# Patient Record
Sex: FEMALE | Race: WHITE | NOT HISPANIC OR LATINO | URBAN - METROPOLITAN AREA
[De-identification: names, ages, dates, MRNs, and addresses within clinical notes are randomized per-mention and may not be internally consistent; named-entity substitution may affect disease eponyms.]

---

## 2018-12-26 ENCOUNTER — INPATIENT (INPATIENT)
Facility: HOSPITAL | Age: 83
LOS: 4 days | Discharge: HOSPICE | DRG: 871 | End: 2018-12-31
Attending: HOSPITALIST | Admitting: FAMILY MEDICINE
Payer: MEDICARE

## 2018-12-26 VITALS
WEIGHT: 160.06 LBS | HEART RATE: 96 BPM | SYSTOLIC BLOOD PRESSURE: 158 MMHG | RESPIRATION RATE: 22 BRPM | DIASTOLIC BLOOD PRESSURE: 118 MMHG

## 2018-12-26 DIAGNOSIS — Z85.038 PERSONAL HISTORY OF OTHER MALIGNANT NEOPLASM OF LARGE INTESTINE: Chronic | ICD-10-CM

## 2018-12-26 DIAGNOSIS — R06.03 ACUTE RESPIRATORY DISTRESS: ICD-10-CM

## 2018-12-26 DIAGNOSIS — F03.90 UNSPECIFIED DEMENTIA WITHOUT BEHAVIORAL DISTURBANCE: ICD-10-CM

## 2018-12-26 DIAGNOSIS — G93.40 ENCEPHALOPATHY, UNSPECIFIED: ICD-10-CM

## 2018-12-26 DIAGNOSIS — Z29.9 ENCOUNTER FOR PROPHYLACTIC MEASURES, UNSPECIFIED: ICD-10-CM

## 2018-12-26 DIAGNOSIS — J18.9 PNEUMONIA, UNSPECIFIED ORGANISM: ICD-10-CM

## 2018-12-26 LAB
ALBUMIN SERPL ELPH-MCNC: 3 G/DL — LOW (ref 3.3–5)
ALP SERPL-CCNC: 113 U/L — SIGNIFICANT CHANGE UP (ref 40–120)
ALT FLD-CCNC: 51 U/L — SIGNIFICANT CHANGE UP (ref 12–78)
ANION GAP SERPL CALC-SCNC: 11 MMOL/L — SIGNIFICANT CHANGE UP (ref 5–17)
APTT BLD: 26.1 SEC — LOW (ref 28.5–37)
AST SERPL-CCNC: 67 U/L — HIGH (ref 15–37)
BASOPHILS # BLD AUTO: 0.25 K/UL — HIGH (ref 0–0.2)
BASOPHILS NFR BLD AUTO: 1 % — SIGNIFICANT CHANGE UP (ref 0–2)
BILIRUB SERPL-MCNC: 0.6 MG/DL — SIGNIFICANT CHANGE UP (ref 0.2–1.2)
BUN SERPL-MCNC: 26 MG/DL — HIGH (ref 7–23)
CALCIUM SERPL-MCNC: 8.6 MG/DL — SIGNIFICANT CHANGE UP (ref 8.5–10.1)
CHLORIDE SERPL-SCNC: 108 MMOL/L — SIGNIFICANT CHANGE UP (ref 96–108)
CO2 SERPL-SCNC: 24 MMOL/L — SIGNIFICANT CHANGE UP (ref 22–31)
CREAT SERPL-MCNC: 1.1 MG/DL — SIGNIFICANT CHANGE UP (ref 0.5–1.3)
CRP SERPL-MCNC: 3.03 MG/DL — HIGH (ref 0–0.4)
EOSINOPHIL # BLD AUTO: 0 K/UL — SIGNIFICANT CHANGE UP (ref 0–0.5)
EOSINOPHIL NFR BLD AUTO: 0 % — SIGNIFICANT CHANGE UP (ref 0–6)
FLU A RESULT: SIGNIFICANT CHANGE UP
FLU A RESULT: SIGNIFICANT CHANGE UP
FLUAV AG NPH QL: SIGNIFICANT CHANGE UP
FLUBV AG NPH QL: SIGNIFICANT CHANGE UP
GLUCOSE SERPL-MCNC: 215 MG/DL — HIGH (ref 70–99)
HCT VFR BLD CALC: 40.4 % — SIGNIFICANT CHANGE UP (ref 34.5–45)
HGB BLD-MCNC: 13.2 G/DL — SIGNIFICANT CHANGE UP (ref 11.5–15.5)
INR BLD: 1.05 RATIO — SIGNIFICANT CHANGE UP (ref 0.88–1.16)
LACTATE SERPL-SCNC: 4.4 MMOL/L — CRITICAL HIGH (ref 0.7–2)
LACTATE SERPL-SCNC: 5.7 MMOL/L — CRITICAL HIGH (ref 0.7–2)
LYMPHOCYTES # BLD AUTO: 1.78 K/UL — SIGNIFICANT CHANGE UP (ref 1–3.3)
LYMPHOCYTES # BLD AUTO: 7 % — LOW (ref 13–44)
MCHC RBC-ENTMCNC: 32.7 GM/DL — SIGNIFICANT CHANGE UP (ref 32–36)
MCHC RBC-ENTMCNC: 32.8 PG — SIGNIFICANT CHANGE UP (ref 27–34)
MCV RBC AUTO: 100.2 FL — HIGH (ref 80–100)
MONOCYTES # BLD AUTO: 1.78 K/UL — HIGH (ref 0–0.9)
MONOCYTES NFR BLD AUTO: 7 % — SIGNIFICANT CHANGE UP (ref 2–14)
NEUTROPHILS # BLD AUTO: 21.56 K/UL — HIGH (ref 1.8–7.4)
NEUTROPHILS NFR BLD AUTO: 77 % — SIGNIFICANT CHANGE UP (ref 43–77)
PLATELET # BLD AUTO: 304 K/UL — SIGNIFICANT CHANGE UP (ref 150–400)
POTASSIUM SERPL-MCNC: 3.5 MMOL/L — SIGNIFICANT CHANGE UP (ref 3.5–5.3)
POTASSIUM SERPL-SCNC: 3.5 MMOL/L — SIGNIFICANT CHANGE UP (ref 3.5–5.3)
PROT SERPL-MCNC: 7.6 G/DL — SIGNIFICANT CHANGE UP (ref 6–8.3)
PROTHROM AB SERPL-ACNC: 12 SEC — SIGNIFICANT CHANGE UP (ref 10–12.9)
RBC # BLD: 4.03 M/UL — SIGNIFICANT CHANGE UP (ref 3.8–5.2)
RBC # FLD: 13.1 % — SIGNIFICANT CHANGE UP (ref 10.3–14.5)
RSV RESULT: SIGNIFICANT CHANGE UP
RSV RNA RESP QL NAA+PROBE: SIGNIFICANT CHANGE UP
SODIUM SERPL-SCNC: 143 MMOL/L — SIGNIFICANT CHANGE UP (ref 135–145)
WBC # BLD: 25.37 K/UL — HIGH (ref 3.8–10.5)
WBC # FLD AUTO: 25.37 K/UL — HIGH (ref 3.8–10.5)

## 2018-12-26 PROCEDURE — 99223 1ST HOSP IP/OBS HIGH 75: CPT | Mod: AI

## 2018-12-26 PROCEDURE — 99285 EMERGENCY DEPT VISIT HI MDM: CPT

## 2018-12-26 PROCEDURE — 93010 ELECTROCARDIOGRAM REPORT: CPT

## 2018-12-26 PROCEDURE — 99223 1ST HOSP IP/OBS HIGH 75: CPT

## 2018-12-26 PROCEDURE — 71045 X-RAY EXAM CHEST 1 VIEW: CPT | Mod: 26

## 2018-12-26 RX ORDER — ENOXAPARIN SODIUM 100 MG/ML
30 INJECTION SUBCUTANEOUS DAILY
Qty: 0 | Refills: 0 | Status: DISCONTINUED | OUTPATIENT
Start: 2018-12-26 | End: 2018-12-31

## 2018-12-26 RX ORDER — AZITHROMYCIN 500 MG/1
500 TABLET, FILM COATED ORAL ONCE
Qty: 0 | Refills: 0 | Status: COMPLETED | OUTPATIENT
Start: 2018-12-26 | End: 2018-12-26

## 2018-12-26 RX ORDER — ASPIRIN/CALCIUM CARB/MAGNESIUM 324 MG
325 TABLET ORAL ONCE
Qty: 0 | Refills: 0 | Status: DISCONTINUED | OUTPATIENT
Start: 2018-12-26 | End: 2018-12-26

## 2018-12-26 RX ORDER — SODIUM CHLORIDE 9 MG/ML
1000 INJECTION INTRAMUSCULAR; INTRAVENOUS; SUBCUTANEOUS ONCE
Qty: 0 | Refills: 0 | Status: COMPLETED | OUTPATIENT
Start: 2018-12-26 | End: 2018-12-26

## 2018-12-26 RX ORDER — CEFTRIAXONE 500 MG/1
1 INJECTION, POWDER, FOR SOLUTION INTRAMUSCULAR; INTRAVENOUS EVERY 24 HOURS
Qty: 0 | Refills: 0 | Status: DISCONTINUED | OUTPATIENT
Start: 2018-12-26 | End: 2018-12-30

## 2018-12-26 RX ORDER — FUROSEMIDE 40 MG
40 TABLET ORAL ONCE
Qty: 0 | Refills: 0 | Status: COMPLETED | OUTPATIENT
Start: 2018-12-26 | End: 2018-12-26

## 2018-12-26 RX ORDER — IPRATROPIUM/ALBUTEROL SULFATE 18-103MCG
3 AEROSOL WITH ADAPTER (GRAM) INHALATION ONCE
Qty: 0 | Refills: 0 | Status: COMPLETED | OUTPATIENT
Start: 2018-12-26 | End: 2018-12-26

## 2018-12-26 RX ORDER — CEFTRIAXONE 500 MG/1
1 INJECTION, POWDER, FOR SOLUTION INTRAMUSCULAR; INTRAVENOUS ONCE
Qty: 0 | Refills: 0 | Status: COMPLETED | OUTPATIENT
Start: 2018-12-26 | End: 2018-12-26

## 2018-12-26 RX ORDER — AZITHROMYCIN 500 MG/1
500 TABLET, FILM COATED ORAL EVERY 24 HOURS
Qty: 0 | Refills: 0 | Status: DISCONTINUED | OUTPATIENT
Start: 2018-12-26 | End: 2018-12-29

## 2018-12-26 RX ORDER — ASPIRIN/CALCIUM CARB/MAGNESIUM 324 MG
300 TABLET ORAL ONCE
Qty: 0 | Refills: 0 | Status: COMPLETED | OUTPATIENT
Start: 2018-12-26 | End: 2018-12-26

## 2018-12-26 RX ADMIN — CEFTRIAXONE 100 GRAM(S): 500 INJECTION, POWDER, FOR SOLUTION INTRAMUSCULAR; INTRAVENOUS at 08:22

## 2018-12-26 RX ADMIN — Medication 300 MILLIGRAM(S): at 08:50

## 2018-12-26 RX ADMIN — SODIUM CHLORIDE 1000 MILLILITER(S): 9 INJECTION INTRAMUSCULAR; INTRAVENOUS; SUBCUTANEOUS at 08:50

## 2018-12-26 RX ADMIN — Medication 3 MILLILITER(S): at 08:14

## 2018-12-26 RX ADMIN — Medication 40 MILLIGRAM(S): at 08:22

## 2018-12-26 RX ADMIN — SODIUM CHLORIDE 1000 MILLILITER(S): 9 INJECTION INTRAMUSCULAR; INTRAVENOUS; SUBCUTANEOUS at 12:36

## 2018-12-26 RX ADMIN — AZITHROMYCIN 500 MILLIGRAM(S): 500 TABLET, FILM COATED ORAL at 12:36

## 2018-12-26 RX ADMIN — AZITHROMYCIN 255 MILLIGRAM(S): 500 TABLET, FILM COATED ORAL at 08:38

## 2018-12-26 RX ADMIN — CEFTRIAXONE 1 GRAM(S): 500 INJECTION, POWDER, FOR SOLUTION INTRAMUSCULAR; INTRAVENOUS at 09:00

## 2018-12-26 NOTE — CONSULT NOTE ADULT - ASSESSMENT
89 yo female hx of complete heart block (no pacemaker), htn BIBEMS from private nursing facility c/o shortness of breath/possible aspiration pneumonia. EMS placed patient on cpap.  Found to be hypoxic to 80's at facility.  Has MOLST is DNR/DNI. History was obtained from Dr. Lau (PMD) and daughter in law by Dr. King. Family members not present as bedside when I went to assess.  Here in ED WBC 25 lactate 5.7 Trop 0.375 proBNP 53071. Chest X ray reveals patchy infiltrates at lung bases and a small left pleural effusion with cardiomegaly. No prior echos available. EKG reveals sinus rhythm with occasional premature ventricular complexes in a pattern of bigeminy and possible premature atrial complexes with aberrant conduction. Left axis deviation and LBBB.     - FU CK, CKMB and trend CE q6   - Monitor closely for the development of anginal symptoms or clinical signs of ischemia.   - Elevated pro BNP ??   - Echo to assess for structural cardiac disease   - Continue BiPAP and abx as SOB likely 2/2 PNA    - Monitor and replete lytes, keep K>4, Mg>2.  - Strict I/Os, daily weights.  - Other cardiovascular workup will depend on clinical course.  - All other workup per primary team.  - Will continue to follow. 89 yo female hx of complete heart block (no pacemaker), htn BIBEMS from private nursing facility c/o shortness of breath/possible aspiration pneumonia. EMS placed patient on cpap.  Found to be hypoxic to 80's at facility.  Has MOLST is DNR/DNI. History was obtained from Dr. Lau (PMD) and daughter in law by Dr. King. Family members not present as bedside when I went to assess.  Here in ED WBC 25 lactate 5.7 Trop 0.375 proBNP 34115. Chest X ray reveals patchy infiltrates at lung bases and a small left pleural effusion with cardiomegaly. No prior echos available. EKG reveals sinus rhythm with occasional premature ventricular complexes in a pattern of bigeminy and possible premature atrial complexes with aberrant conduction. Left axis deviation and LBBB.     - FU CK, CKMB and trend CE q6   - Monitor closely for the development of anginal symptoms or clinical signs of ischemia.   - Elevated pro BNP possibly volume overloaded. S/p lasix 40 IV. Continue to monitor volume status.   - Echo to assess for structural cardiac disease   - Continue BiPAP and abx as SOB likely 2/2 PNA    - Monitor and replete lytes, keep K>4, Mg>2.  - Strict I/Os, daily weights.  - Other cardiovascular workup will depend on clinical course.  - All other workup per primary team.  - Will continue to follow. 89 yo female hx of complete heart block (no pacemaker), htn BIBEMS from private nursing facility c/o shortness of breath/possible aspiration pneumonia. EMS placed patient on cpap.  Found to be hypoxic to 80's at facility.  Has MOLST is DNR/DNI. History was obtained from Dr. Lau (PMD) and daughter in law by Dr. King. Family members not present as bedside when I went to assess.  Here in ED WBC 25 lactate 5.7 Trop 0.375 proBNP 33021. Chest X ray reveals patchy infiltrates at lung bases and a small left pleural effusion with cardiomegaly. No prior echos available. EKG reveals sinus rhythm with LBBB and occasional premature ventricular complexes in a pattern of bigeminy vs possible premature atrial complexes with aberrant conduction.    - Doubt ACS. CE leak most likely in the setting of sepsis and lactic acidosis.  - F/U CK, CKMB and trend CE until peak  - Monitor closely for the development of anginal symptoms or clinical signs of ischemia. The chronicity of her LBBB is unknown. Can start ASA 81 mg po daily   - Elevated pro BNP possibly volume overloaded though her exam is not impressive. S/p lasix 40 IV. Would try to keep net even, and continue to monitor volume status.   - Echo to assess for structural cardiac disease   - Continue BiPAP and Abx. Oxygen supplementation as necessary  - Monitor and replete lytes, keep K>4, Mg>2.  - Strict I/Os, daily weights.  - Other cardiovascular workup will depend on clinical course.  - Will continue to follow.  - Prognosis is guarded. Goals of care to be discussed with the family.

## 2018-12-26 NOTE — INPATIENT CERTIFICATION FOR MEDICARE PATIENTS - RISKS OF ADVERSE EVENTS
Concern for delay in diagnosis and treatment/Concern for renal deterioration/Other:/Concern for neurologic deterioration/Concern for cardiopulmonary deterioration

## 2018-12-26 NOTE — ED ADULT NURSE NOTE - OBJECTIVE STATEMENT
Per EMS pt experienced difficulty breathing this AM, pt is from nursing [facility] home, and is normally able to breathe spontaneously without difficulty, per son, Dr. Villalobos pt is confused at baseline but alert.

## 2018-12-26 NOTE — ED PROVIDER NOTE - PROGRESS NOTE DETAILS
discussed case with Dr. Blanchard, will admit on tele discussed case with Dr. Calix, 30cc/kg of IV fluids not met since patient may be in CHF in fear of fluid overload

## 2018-12-26 NOTE — CONSULT NOTE ADULT - SUBJECTIVE AND OBJECTIVE BOX
[facility]         Arlyn Hester, José Miguel, Amaris, Ruby, Eddie, Yogi        [phone] (office)    CHIEF COMPLAINT: Patient is a 89y old  Female who presents with a chief complaint of     HPI:   89 yo female hx of complete heart block (no pacemaker), htn BIBEMS from private nursing facility c/o shortness of breath/possible aspiration pneumonia. EMS placed patient on cpap.  Found to be hypoxic to 80's at facility.  Has MOLST is DNR/DNI. History was obtained from Dr. Lau (PMD) and daughter in law by Dr. King. Family members not present as bedside when I went to assess.      PAST MEDICAL & SURGICAL HISTORY:  Colon cancer  Constipation  H/O colon cancer, stage III: resection, colostomy      SOCIAL HISTORY: No active tobacco, alcohol or illicit drug use.    FAMILY HISTORY:      Home Medications:  Senna 8.6 mg oral tablet: orally once a day (26 Dec 2018 08:42)  Tylenol:  (26 Dec 2018 08:42)      MEDICATIONS  (STANDING):  sodium chloride 0.9% Bolus 1000 milliLiter(s) IV Bolus once    MEDICATIONS  (PRN):      Allergies    sulfa drugs (Rash)    Intolerances        REVIEW OF SYSTEMS: Is negative for eye, ENT, GI, , allergic, dermatologic, musculoskeletal and neurologic, except as described above.    VITAL SIGNS:   Vital Signs Last 24 Hrs  T(C): 37.2 (26 Dec 2018 08:27), Max: 37.2 (26 Dec 2018 08:27)  T(F): 98.9 (26 Dec 2018 08:27), Max: 98.9 (26 Dec 2018 08:27)  HR: 77 (26 Dec 2018 09:12) (72 - 96)  BP: 135/64 (26 Dec 2018 09:12) (135/64 - 158/118)  BP(mean): --  RR: 22 (26 Dec 2018 07:55) (22 - 22)  SpO2: 98% (26 Dec 2018 09:12) (91% - 98%)    I&O's Summary      PHYSICAL EXAM:  Constitutional: NAD, not responsive, on BiPAP   Eyes: EOMI, no oral cyanosis, conjunctivae clear, anicteric  Pulmonary: On BiPAP, coarse breath sounds bilaterally.   Cardiovascular: soft s1 and s2, normal rate. No murmur  Gastrointestinal: Bowel sounds present, soft, nontender  Lymph: No peripheral edema   Neurological: AAOX0  Skin: Warm, well-perfused      LABS: All Labs Reviewed:                        13.2   25.37 )-----------( 304      ( 26 Dec 2018 08:03 )             40.4     26 Dec 2018 08:03    143    |  108    |  26     ----------------------------<  215    3.5     |  24     |  1.10     Ca    8.6        26 Dec 2018 08:03    TPro  7.6    /  Alb  3.0    /  TBili  0.6    /  DBili  x      /  AST  67     /  ALT  51     /  AlkPhos  113    26 Dec 2018 08:03    PT/INR - ( 26 Dec 2018 08:03 )   PT: 12.0 sec;   INR: 1.05 ratio         PTT - ( 26 Dec 2018 08:03 )  PTT:26.1 sec  CARDIAC MARKERS ( 26 Dec 2018 08:03 )  .375 ng/mL / x     / x     / x     / x          Blood Culture:   12-26 @ 08:03  Pro Bnp [id number]        RADIOLOGY:  Vent. rate 81   NE interval 164   QRS interval 168   QT/QTc 458/532   P-R-T 59 -34- 110   Sinus rhythm with occasional premature ventricular complexes in a pattern of bigeminy and possible premature atrial complexes with aberrant conduction.   Left axis deviation   LBBB     EKG:

## 2018-12-26 NOTE — H&P ADULT - NSHPOUTPATIENTPROVIDERS_GEN_ALL_CORE
Dr. Rob Villaolbos pmd/son/caretaker/hcp ph# [phone]; pt is from [facility] on one in [place]

## 2018-12-26 NOTE — ED PROVIDER NOTE - CARE PLAN
Principal Discharge DX:	Shortness of breath Principal Discharge DX:	Pneumonia due to infectious organism, unspecified laterality, unspecified part of lung  Secondary Diagnosis:	Elevated troponin

## 2018-12-26 NOTE — H&P ADULT - HISTORY OF PRESENT ILLNESS
89 yo F with pmh dementia,  complete heart block (no pacemaker), htn BIBEMS from private nursing facility p/w sob and respiratory distress requiring bipap.  Found to be hypoxic to 80's at facility.  Has MOLST is DNR/DNI. History was obtained from Dr. Lau (PMD/son) via telephone.     In the ED pt recieved empiric antibx, bipap and was lethargic hard to arouse.

## 2018-12-26 NOTE — H&P ADULT - ASSESSMENT
89 yo F with pmh dementia,  complete heart block (no pacemaker), htn BIBEMS from private nursing facility p/w sob and respiratory distress requiring bipap a/w acute respiratory distress sec to suspect asp pna and poss pulm edema questionable chf

## 2018-12-26 NOTE — H&P ADULT - PROBLEM SELECTOR PLAN 1
acute resp failure requiring bipap with hypoxia  appec pulm recs and palliative recs  iv diuresis prn  check echo  empiric antibx, may need gnr coverage if asp suspect  s/s eval  npo until evaled  caution with fluids as may be easily overloaded, prognosis guarded to poor

## 2018-12-26 NOTE — CONSULT NOTE ADULT - PROBLEM SELECTOR RECOMMENDATION 9
sepsis  pna  resp distress  frail elderly  atelectasis  will assess off BIPAP  cxr reviewed  labs reviewed  gentle IVF  cont Rocephin and Mikala for poss PNA  pt is DNR DNI - MOLST reviewed  left a message for son - who is a Physician  monitor vs and HD and Sat , keep sat > 88 pct  will follow and monitor  prognosis guarded  advanced age, frail, weak, with acute infection and sepsis  cx, biomarkers, and work up under way

## 2018-12-26 NOTE — ED ADULT NURSE REASSESSMENT NOTE - NS ED NURSE REASSESS COMMENT FT1
Pt weaned off of Bipap per ordered, and now is on 4 liters of nasal cannula staying above 88%, between 93-95%.

## 2018-12-26 NOTE — H&P ADULT - PROBLEM SELECTOR PLAN 2
advanced  dnr./dni  son does not want aggressive measure but treat supportively and empirically and monitor clinical progress

## 2018-12-26 NOTE — ED PROVIDER NOTE - OBJECTIVE STATEMENT
89 yo female hx of complete heart block (no pacemaker), htn BIBEMS from private nursing facility c/o shortness of breath/possible aspiration pneumonia. EMS placed patient on cpap.  Here with son Dr. Lau (PMD) and daughter in law.  Found to be hypoxic to 80's at facility.  Has MOLST is DNR/DNI

## 2018-12-26 NOTE — H&P ADULT - PROBLEM SELECTOR PLAN 3
sec to acute resp distress, hypoxia and advanced dementia as well advanced age  monitor neuro status as per routine  monitor clinical course  consider neuro consult if not improving

## 2018-12-26 NOTE — CONSULT NOTE ADULT - SUBJECTIVE AND OBJECTIVE BOX
Date/Time Patient Seen:  		  Referring MD:   Data Reviewed	       Patient is a 89y old  Female who presents with a chief complaint of     Subjective/HPI    seen and examined  vs and meds reviewed  H and P reviewed  ER provider note reviewed  on BIPAP     labs and imaging reviewed    Consult Note Adult-Cardiology Resident [Charted Location: [place] [address]] [Authored: 26-Dec-2018 09:28]- for Visit: [id number], Incomplete, Revised, Signed w/additional Signatures Pending, General    Consult Note:   · Provider Specialty	Cardiology    Referral/Consultation:    Initial Consult:  · Requested by Name:	MD  · Date/Time:	26-Dec-2018 09:28  · Reason for Referral/Consultation:	Elevated pro BNP, elevated trops      · Subjective and Objective:   [facility]         Arlyn Hester, Amaris Valdez, Eddie Beltran Savella        [phone] (office)    CHIEF COMPLAINT: Patient is a 89y old  Female who presents with a chief complaint of     HPI:   89 yo female hx of complete heart block (no pacemaker), htn BIBEMS from private nursing facility c/o shortness of breath/possible aspiration pneumonia. EMS placed patient on cpap.  Found to be hypoxic to 80's at facility.  Has MOLST is DNR/DNI. History was obtained from Dr. Lau (PMD) and daughter in law by Dr. King. Family members not present as bedside when I went to assess.      PAST MEDICAL & SURGICAL HISTORY:  Colon cancer  Constipation  H/O colon cancer, stage III: resection, colostomy      SOCIAL HISTORY: No active tobacco, alcohol or illicit drug use.    FAMILY HISTORY:      Home Medications:  Senna 8.6 mg oral tablet: orally once a day (26 Dec 2018 08:42)  Tylenol:  (26 Dec 2018 08:42)      MEDICATIONS  (STANDING):  sodium chloride 0.9% Bolus 1000 milliLiter(s) IV Bolus once     PAST MEDICAL & SURGICAL HISTORY:  Colon cancer  Constipation  H/O colon cancer, stage III: resection, colostomy        Medication list         MEDICATIONS  (STANDING):  sodium chloride 0.9% Bolus 1000 milliLiter(s) IV Bolus once    MEDICATIONS  (PRN):  ED Provider Note [Charted Location: [place] [address]] [Authored: 26-Dec-2018 08:10]- for Visit: [id number], Complete, Revised, Signed in Full, General    HISTORY OF PRESENT ILLNESS:    High Risk Travel:  International Travel? No(1)    · Chief Complaint: The patient is a 89y Female complaining of difficulty breathing.  · Unable to Obtain: Dementia  · HPI Objective Statement: 89 yo female hx of complete heart block (no pacemaker), htn BIBEMS from private nursing facility c/o shortness of breath/possible aspiration pneumonia. EMS placed patient on cpap.  Here with son Dr. Lau (PMD) and daughter in law.  Found to be hypoxic to 80's at facility.  Has MOLST is DNR/DNI    PAST MEDICAL/SURGICAL/FAMILY/SOCIAL HISTORY:    Past Medical History:  Colon cancer    Constipation.     Past Surgical History:  H/O colon cancer, stage III  resection, colostomy.     Tobacco Usage:  · Tobacco Usage	Unknown if ever smoked  · Unknown smoker reason	Cognitively Impaired    ALLERGIES AND HOME MEDICATIONS:   Allergies:        Allergies:  	sulfa drugs: Drug Category, Rash       Vitals log        ICU Vital Signs Last 24 Hrs  T(C): 37.2 (26 Dec 2018 08:27), Max: 37.2 (26 Dec 2018 08:27)  T(F): 98.9 (26 Dec 2018 08:27), Max: 98.9 (26 Dec 2018 08:27)  HR: 77 (26 Dec 2018 09:12) (72 - 96)  BP: 135/64 (26 Dec 2018 09:12) (135/64 - 158/118)  BP(mean): --  ABP: --  ABP(mean): --  RR: 22 (26 Dec 2018 07:55) (22 - 22)  SpO2: 98% (26 Dec 2018 09:12) (91% - 98%)           Input and Output:  I&O's Detail      Lab Data                        13.2   25.37 )-----------( 304      ( 26 Dec 2018 08:03 )             40.4     12-26    143  |  108  |  26<H>  ----------------------------<  215<H>  3.5   |  24  |  1.10    Ca    8.6      26 Dec 2018 08:03    TPro  7.6  /  Alb  3.0<L>  /  TBili  0.6  /  DBili  x   /  AST  67<H>  /  ALT  51  /  AlkPhos  113  12-26      CARDIAC MARKERS ( 26 Dec 2018 09:40 )  x     / x     / 80 U/L / x     / 7.1 ng/mL  CARDIAC MARKERS ( 26 Dec 2018 08:03 )  .375 ng/mL / x     / x     / x     / x            Review of Systems	      Objective     Physical Examination    frail  weak  on bipap  lung dec BS  abd soft      Pertinent Lab findings & Imaging      Rush:  NO   Adequate UO     I&O's Detail           Discussed with:     Cultures:	        Radiology    right infiltrate cxr

## 2018-12-26 NOTE — ED PROVIDER NOTE - PRINCIPAL DIAGNOSIS
Shortness of breath Pneumonia due to infectious organism, unspecified laterality, unspecified part of lung

## 2018-12-26 NOTE — CHART NOTE - NSCHARTNOTEFT_GEN_A_CORE
spoke with son, physician  discussed goals of care  mom is DNR DNI  no aggressive measures  ok for ABX and BIPAP prn and o2 support and Diuresis PRN  will follow

## 2018-12-27 PROBLEM — Z00.00 ENCOUNTER FOR PREVENTIVE HEALTH EXAMINATION: Status: ACTIVE | Noted: 2018-12-27

## 2018-12-27 LAB
ALBUMIN SERPL ELPH-MCNC: 2.6 G/DL — LOW (ref 3.3–5)
ALP SERPL-CCNC: 73 U/L — SIGNIFICANT CHANGE UP (ref 40–120)
ALT FLD-CCNC: 33 U/L — SIGNIFICANT CHANGE UP (ref 12–78)
ANION GAP SERPL CALC-SCNC: 5 MMOL/L — SIGNIFICANT CHANGE UP (ref 5–17)
AST SERPL-CCNC: 24 U/L — SIGNIFICANT CHANGE UP (ref 15–37)
BASOPHILS # BLD AUTO: 0.05 K/UL — SIGNIFICANT CHANGE UP (ref 0–0.2)
BASOPHILS NFR BLD AUTO: 0.6 % — SIGNIFICANT CHANGE UP (ref 0–2)
BILIRUB SERPL-MCNC: 0.6 MG/DL — SIGNIFICANT CHANGE UP (ref 0.2–1.2)
BUN SERPL-MCNC: 28 MG/DL — HIGH (ref 7–23)
CALCIUM SERPL-MCNC: 8.3 MG/DL — LOW (ref 8.5–10.1)
CHLORIDE SERPL-SCNC: 108 MMOL/L — SIGNIFICANT CHANGE UP (ref 96–108)
CO2 SERPL-SCNC: 33 MMOL/L — HIGH (ref 22–31)
CREAT SERPL-MCNC: 0.93 MG/DL — SIGNIFICANT CHANGE UP (ref 0.5–1.3)
EOSINOPHIL # BLD AUTO: 0.05 K/UL — SIGNIFICANT CHANGE UP (ref 0–0.5)
EOSINOPHIL NFR BLD AUTO: 0.6 % — SIGNIFICANT CHANGE UP (ref 0–6)
GLUCOSE SERPL-MCNC: 82 MG/DL — SIGNIFICANT CHANGE UP (ref 70–99)
HCT VFR BLD CALC: 33.6 % — LOW (ref 34.5–45)
HGB BLD-MCNC: 11 G/DL — LOW (ref 11.5–15.5)
IMM GRANULOCYTES NFR BLD AUTO: 0.3 % — SIGNIFICANT CHANGE UP (ref 0–1.5)
LACTATE SERPL-SCNC: 1.4 MMOL/L — SIGNIFICANT CHANGE UP (ref 0.7–2)
LYMPHOCYTES # BLD AUTO: 1.1 K/UL — SIGNIFICANT CHANGE UP (ref 1–3.3)
LYMPHOCYTES # BLD AUTO: 12.8 % — LOW (ref 13–44)
MCHC RBC-ENTMCNC: 32.4 PG — SIGNIFICANT CHANGE UP (ref 27–34)
MCHC RBC-ENTMCNC: 32.7 GM/DL — SIGNIFICANT CHANGE UP (ref 32–36)
MCV RBC AUTO: 99.1 FL — SIGNIFICANT CHANGE UP (ref 80–100)
MONOCYTES # BLD AUTO: 0.59 K/UL — SIGNIFICANT CHANGE UP (ref 0–0.9)
MONOCYTES NFR BLD AUTO: 6.8 % — SIGNIFICANT CHANGE UP (ref 2–14)
NEUTROPHILS # BLD AUTO: 6.8 K/UL — SIGNIFICANT CHANGE UP (ref 1.8–7.4)
NEUTROPHILS NFR BLD AUTO: 78.9 % — HIGH (ref 43–77)
NT-PROBNP SERPL-SCNC: HIGH PG/ML (ref 0–450)
PLATELET # BLD AUTO: 197 K/UL — SIGNIFICANT CHANGE UP (ref 150–400)
POTASSIUM SERPL-MCNC: 3.3 MMOL/L — LOW (ref 3.5–5.3)
POTASSIUM SERPL-SCNC: 3.3 MMOL/L — LOW (ref 3.5–5.3)
PROCALCITONIN SERPL-MCNC: 1.07 NG/ML — HIGH (ref 0–0.04)
PROT SERPL-MCNC: 6.2 G/DL — SIGNIFICANT CHANGE UP (ref 6–8.3)
RBC # BLD: 3.39 M/UL — LOW (ref 3.8–5.2)
RBC # FLD: 13.2 % — SIGNIFICANT CHANGE UP (ref 10.3–14.5)
SODIUM SERPL-SCNC: 146 MMOL/L — HIGH (ref 135–145)
WBC # BLD: 8.62 K/UL — SIGNIFICANT CHANGE UP (ref 3.8–10.5)
WBC # FLD AUTO: 8.62 K/UL — SIGNIFICANT CHANGE UP (ref 3.8–10.5)

## 2018-12-27 PROCEDURE — 93306 TTE W/DOPPLER COMPLETE: CPT | Mod: 26

## 2018-12-27 PROCEDURE — 99232 SBSQ HOSP IP/OBS MODERATE 35: CPT

## 2018-12-27 PROCEDURE — 99233 SBSQ HOSP IP/OBS HIGH 50: CPT

## 2018-12-27 RX ORDER — HYDRALAZINE HCL 50 MG
10 TABLET ORAL ONCE
Qty: 0 | Refills: 0 | Status: COMPLETED | OUTPATIENT
Start: 2018-12-27 | End: 2018-12-27

## 2018-12-27 RX ORDER — POTASSIUM CHLORIDE 20 MEQ
10 PACKET (EA) ORAL
Qty: 0 | Refills: 0 | Status: DISCONTINUED | OUTPATIENT
Start: 2018-12-27 | End: 2018-12-27

## 2018-12-27 RX ORDER — ACETAMINOPHEN 500 MG
650 TABLET ORAL EVERY 6 HOURS
Qty: 0 | Refills: 0 | Status: DISCONTINUED | OUTPATIENT
Start: 2018-12-27 | End: 2018-12-31

## 2018-12-27 RX ORDER — MORPHINE SULFATE 50 MG/1
1 CAPSULE, EXTENDED RELEASE ORAL ONCE
Qty: 0 | Refills: 0 | Status: DISCONTINUED | OUTPATIENT
Start: 2018-12-27 | End: 2018-12-27

## 2018-12-27 RX ORDER — POTASSIUM CHLORIDE 20 MEQ
40 PACKET (EA) ORAL ONCE
Qty: 0 | Refills: 0 | Status: COMPLETED | OUTPATIENT
Start: 2018-12-27 | End: 2018-12-27

## 2018-12-27 RX ORDER — MORPHINE SULFATE 50 MG/1
1 CAPSULE, EXTENDED RELEASE ORAL EVERY 4 HOURS
Qty: 0 | Refills: 0 | Status: DISCONTINUED | OUTPATIENT
Start: 2018-12-27 | End: 2018-12-31

## 2018-12-27 RX ORDER — SENNA PLUS 8.6 MG/1
0 TABLET ORAL
Qty: 0 | Refills: 0 | COMMUNITY

## 2018-12-27 RX ADMIN — CEFTRIAXONE 100 GRAM(S): 500 INJECTION, POWDER, FOR SOLUTION INTRAMUSCULAR; INTRAVENOUS at 12:29

## 2018-12-27 RX ADMIN — Medication 40 MILLIEQUIVALENT(S): at 12:31

## 2018-12-27 RX ADMIN — MORPHINE SULFATE 1 MILLIGRAM(S): 50 CAPSULE, EXTENDED RELEASE ORAL at 21:12

## 2018-12-27 RX ADMIN — MORPHINE SULFATE 1 MILLIGRAM(S): 50 CAPSULE, EXTENDED RELEASE ORAL at 14:08

## 2018-12-27 RX ADMIN — ENOXAPARIN SODIUM 30 MILLIGRAM(S): 100 INJECTION SUBCUTANEOUS at 12:32

## 2018-12-27 RX ADMIN — Medication 10 MILLIGRAM(S): at 06:23

## 2018-12-27 RX ADMIN — AZITHROMYCIN 255 MILLIGRAM(S): 500 TABLET, FILM COATED ORAL at 12:29

## 2018-12-27 RX ADMIN — MORPHINE SULFATE 1 MILLIGRAM(S): 50 CAPSULE, EXTENDED RELEASE ORAL at 13:05

## 2018-12-27 RX ADMIN — MORPHINE SULFATE 1 MILLIGRAM(S): 50 CAPSULE, EXTENDED RELEASE ORAL at 21:21

## 2018-12-27 RX ADMIN — Medication 10 MILLIGRAM(S): at 22:57

## 2018-12-27 NOTE — PROGRESS NOTE ADULT - ASSESSMENT
89 yo female hx of complete heart block (no pacemaker), htn BIBEMS from private nursing facility c/o shortness of breath/possible aspiration pneumonia. EMS placed patient on cpap.  Found to be hypoxic to 80's at facility.  Has MOLST is DNR/DNI. History was obtained from Dr. Lau (PMD) and daughter in law by Dr. King. Family members not present as bedside when I went to assess.  Here in ED WBC 25 lactate 5.7 Trop 0.375 proBNP 57535. Chest X ray reveals patchy infiltrates at lung bases and a small left pleural effusion with cardiomegaly. No prior echos available. EKG reveals sinus rhythm with LBBB and occasional premature ventricular complexes in a pattern of bigeminy vs possible premature atrial complexes with aberrant conduction.    - Doubt ACS. CE leak most likely in the setting of sepsis and lactic acidosis.  - F/U CK, CKMB and trend CE until peak  - Monitor closely for the development of anginal symptoms or clinical signs of ischemia. The chronicity of her LBBB is unknown. Can start ASA 81 mg po daily   - Elevated pro BNP possibly volume overloaded though her exam is not impressive. S/p lasix 40 IV. Would try to keep net even, and continue to monitor volume status. If needed can repeat lasix 40 iv  - Echo to assess for structural cardiac disease   - Continue BiPAP and Abx. Oxygen supplementation as necessary  - Monitor and replete lytes, keep K>4, Mg>2.  - Strict I/Os, daily weights.  - Other cardiovascular workup will depend on clinical course.  - Will continue to follow.  - Prognosis is guarded. Goals of care discussed with the family only noninvasive measures to be used as per son who is a MD, as per pulm note.  Iraj Miles ANP  Cardiology 89 yo female hx of complete heart block (no pacemaker), htn BIBEMS from private nursing facility c/o shortness of breath/possible aspiration pneumonia. EMS placed patient on cpap.  Found to be hypoxic to 80's at facility.  Has MOLST is DNR/DNI. History was obtained from Dr. Lau (PMD) and daughter in law by Dr. King. Family members not present as bedside when I went to assess.  Here in ED WBC 25 lactate 5.7 Trop 0.375 proBNP 55076. Chest X ray reveals patchy infiltrates at lung bases and a small left pleural effusion with cardiomegaly. No prior echos available. EKG reveals sinus rhythm with LBBB and occasional premature ventricular complexes in a pattern of bigeminy vs possible premature atrial complexes with aberrant conduction.    - Doubt ACS. CE leak most likely in the setting of sepsis and lactic acidosis.  Lactate normalized after fluid resuscitation of 2 L  - No need to trend CE's  - The chronicity of her LBBB is unknown.   - Elevated pro BNP possibly volume overloaded, though, her exam does not support it.  S/p lasix 40 IV. Would try to keep net even, and continue to monitor volume status. If needed can repeat lasix 40 iv  - Echo shows severe LVF, EF 25-30%, mild MR and pHTN  - Continue BiPAP and Abx. Oxygen supplementation as necessary.  Follow Pulm recs  - Monitor and replete lytes, keep K>4, Mg>2.  - Patient is a DNR/DNI and now on comfort care per son, who is a physician.    Rita Lozano NP  Cardiology

## 2018-12-27 NOTE — DIETITIAN INITIAL EVALUATION ADULT. - NUTRITION INTERVENTION
Collaboration and Referral of Nutrition Care Medical Food Supplements/Vitamin/Collaboration and Referral of Nutrition Care/Meals and Snack/Enteral Nutrition

## 2018-12-27 NOTE — DIETITIAN INITIAL EVALUATION ADULT. - PHYSICAL APPEARANCE
Unable to assess pts BMI as pts height not available. Frail appearance; moderate temporal and clavicular wasting observed.

## 2018-12-27 NOTE — SWALLOW BEDSIDE ASSESSMENT ADULT - PHARYNGEAL PHASE
Decreased laryngeal elevation/Multiple swallows/Throat clear post oral intake/Delayed cough post oral intake

## 2018-12-27 NOTE — DIETITIAN INITIAL EVALUATION ADULT. - FACTORS AFF FOOD INTAKE
change in mental status/difficulty chewing/difficulty feeding self/difficulty swallowing difficulty feeding self/difficulty chewing/difficulty swallowing/dx PNA/change in mental status/other (specify)

## 2018-12-27 NOTE — PROGRESS NOTE ADULT - SUBJECTIVE AND OBJECTIVE BOX
[facility] -- Arlyn Hester, José Miguel, Amaris, Eddie Beltran Savella  Office # [phone]      Follow Up:    Elevated pro BNP, elevated trops  Subjective/Observations:   No events overnight resting comfortably in bed.  No complaints of chest pain, dyspnea, or palpitations reported. No signs of orthopnea or PND.     REVIEW OF SYSTEMS: All other review of systems is negative unless indicated above    PAST MEDICAL & SURGICAL HISTORY:  Dementia  Colon cancer  Constipation  H/O colon cancer, stage III: resection, colostomy      MEDICATIONS  (STANDING):  azithromycin  IVPB 500 milliGRAM(s) IV Intermittent every 24 hours  cefTRIAXone   IVPB 1 Gram(s) IV Intermittent every 24 hours  enoxaparin Injectable 30 milliGRAM(s) SubCutaneous daily  potassium chloride    Tablet ER 40 milliEquivalent(s) Oral once    MEDICATIONS  (PRN):  acetaminophen   Tablet .. 650 milliGRAM(s) Oral every 6 hours PRN Temp greater or equal to 38C (100.4F), Mild Pain (1 - 3)  aluminum hydroxide/magnesium hydroxide/simethicone Suspension 30 milliLiter(s) Oral every 4 hours PRN Dyspepsia  bisacodyl 5 milliGRAM(s) Oral every 12 hours PRN Constipation  guaiFENesin   Syrup  (Sugar-Free) 200 milliGRAM(s) Oral every 6 hours PRN Cough      Allergies    sulfa drugs (Rash)    Intolerances        Vital Signs Last 24 Hrs  T(C): 36.5 (27 Dec 2018 05:14), Max: 36.5 (26 Dec 2018 22:01)  T(F): 97.7 (27 Dec 2018 05:14), Max: 97.7 (26 Dec 2018 22:01)  HR: 64 (27 Dec 2018 05:50) (55 - 92)  BP: 184/82 (27 Dec 2018 05:50) (162/80 - 184/82)  BP(mean): 119 (26 Dec 2018 22:01) (119 - 119)  RR: 17 (27 Dec 2018 05:50) (16 - 22)  SpO2: 99% (27 Dec 2018 05:14) (93% - 100%)    I&O's Summary        PHYSICAL EXAM:  TELE:   Constitutional: NAD, awake and alert, well-developed  HEENT: Moist Mucous Membranes, Anicteric  Pulmonary: Non-labored, breath sounds are clear bilaterally, No wheezing, crackles or rhonchi  Cardiovascular: Regular, S1 and S2 nl, No murmurs, rubs, gallops or clicks  Gastrointestinal: Bowel Sounds present, soft, nontender.   Lymph: No lymphadenopathy. No peripheral edema.  Skin: No visible rashes or ulcers.  Psych:  Mood & affect appropriate    LABS: All Labs Reviewed:                        11.0   8.62  )-----------( 197      ( 27 Dec 2018 06:12 )             33.6                         13.2   25.37 )-----------( 304      ( 26 Dec 2018 08:03 )             40.4     27 Dec 2018 06:12    146    |  108    |  28     ----------------------------<  82     3.3     |  33     |  0.93   26 Dec 2018 08:03    143    |  108    |  26     ----------------------------<  215    3.5     |  24     |  1.10     Ca    8.3        27 Dec 2018 06:12  Ca    8.6        26 Dec 2018 08:03    TPro  6.2    /  Alb  2.6    /  TBili  0.6    /  DBili  x      /  AST  24     /  ALT  33     /  AlkPhos  73     27 Dec 2018 06:12  TPro  7.6    /  Alb  3.0    /  TBili  0.6    /  DBili  x      /  AST  67     /  ALT  51     /  AlkPhos  113    26 Dec 2018 08:03    PT/INR - ( 26 Dec 2018 08:03 )   PT: 12.0 sec;   INR: 1.05 ratio         PTT - ( 26 Dec 2018 08:03 )  PTT:26.1 sec  CARDIAC MARKERS ( 26 Dec 2018 09:40 )  x     / x     / 80 U/L / x     / 7.1 ng/mL  CARDIAC MARKERS ( 26 Dec 2018 08:03 )  .375 ng/mL / x     / x     / x     / x             ECG:  < from: 12 Lead ECG (12.26.18 @ 08:33) >  Ventricular Rate 81 BPM    Atrial Rate 81 BPM    P-R Interval 164 ms    QRS Duration 168 ms    Q-T Interval 458 ms    QTC Calculation(Bezet) 532 ms    P Axis 59 degrees    R Axis -34 degrees    T Axis 110 degrees    Diagnosis Line Sinus rhythm withoccasional premature ventricular complexes and Possible premature atrial complexes with aberrant conduction  Left axis deviation  Left bundle branch block  Abnormal ECG  No previous ECGs available  Confirmed by Kaitlin Deutsch ([id number]) on 12/26/2018 10:08:18 AM    < end of copied text >    Echo:    Radiology:  < from: Xray Chest 1 View AP/PA (12.26.18 @ 08:36) >  EXAM:  XR CHEST AP OR PA 1V                            PROCEDURE DATE:  12/26/2018          INTERPRETATION:  History: Dyspnea    Technique:  AP portable    Comparisons:  none    Findings:     Patchy infiltrates at lung bases. Possible small left   pleural effusion.  .    The pulmonary vasculature and aorta are normal   for age. Cardiomegaly.    The thorax is normal for age.    Impression: Patchy infiltrates at lung bases. Small left pleural effusion   with cardiomegaly                VANESSA ZUÑIGA M.D., ATTENDING RADIOLOGIST  This document has been electronically signed. Dec 26 2018  8:42AM    < end of copied text >           Iraj Miles ANP   Cardiology [facility] -- Arlyn Hester, José Miguel, Amaris, Eddie Beltran Savella  Office # [phone]      Follow Up:    Elevated pro BNP, elevated trops  Subjective/Observations:   No events overnight resting comfortably in bed.  No complaints of chest pain, dyspnea, or palpitations reported. No signs of orthopnea or PND.     REVIEW OF SYSTEMS: All other review of systems is negative unless indicated above    PAST MEDICAL & SURGICAL HISTORY:  Dementia  Colon cancer  Constipation  H/O colon cancer, stage III: resection, colostomy      MEDICATIONS  (STANDING):  azithromycin  IVPB 500 milliGRAM(s) IV Intermittent every 24 hours  cefTRIAXone   IVPB 1 Gram(s) IV Intermittent every 24 hours  enoxaparin Injectable 30 milliGRAM(s) SubCutaneous daily  potassium chloride    Tablet ER 40 milliEquivalent(s) Oral once    MEDICATIONS  (PRN):  acetaminophen   Tablet .. 650 milliGRAM(s) Oral every 6 hours PRN Temp greater or equal to 38C (100.4F), Mild Pain (1 - 3)  aluminum hydroxide/magnesium hydroxide/simethicone Suspension 30 milliLiter(s) Oral every 4 hours PRN Dyspepsia  bisacodyl 5 milliGRAM(s) Oral every 12 hours PRN Constipation  guaiFENesin   Syrup  (Sugar-Free) 200 milliGRAM(s) Oral every 6 hours PRN Cough      Allergies    sulfa drugs (Rash)    Intolerances        Vital Signs Last 24 Hrs  T(C): 36.5 (27 Dec 2018 05:14), Max: 36.5 (26 Dec 2018 22:01)  T(F): 97.7 (27 Dec 2018 05:14), Max: 97.7 (26 Dec 2018 22:01)  HR: 64 (27 Dec 2018 05:50) (55 - 92)  BP: 184/82 (27 Dec 2018 05:50) (162/80 - 184/82)  BP(mean): 119 (26 Dec 2018 22:01) (119 - 119)  RR: 17 (27 Dec 2018 05:50) (16 - 22)  SpO2: 99% (27 Dec 2018 05:14) (93% - 100%)    I&O's Summary        PHYSICAL EXAM:  TELE: Off tele   Constitutional: NAD, , well-developed  HEENT: Moist Mucous Membranes, Anicteric  Pulmonary: Non-labored, breath sounds are clear bilaterally, No wheezing, crackles or rhonchi  Cardiovascular: Regular, S1 and S2 nl, No murmurs, rubs, gallops or clicks  Gastrointestinal: Bowel Sounds present, soft, nontender.   Lymph: No lymphadenopathy. No peripheral edema.  Skin: No visible rashes or ulcers.  Psych:  Mood & affect appropriate    LABS: All Labs Reviewed:                        11.0   8.62  )-----------( 197      ( 27 Dec 2018 06:12 )             33.6                         13.2   25.37 )-----------( 304      ( 26 Dec 2018 08:03 )             40.4     27 Dec 2018 06:12    146    |  108    |  28     ----------------------------<  82     3.3     |  33     |  0.93   26 Dec 2018 08:03    143    |  108    |  26     ----------------------------<  215    3.5     |  24     |  1.10     Ca    8.3        27 Dec 2018 06:12  Ca    8.6        26 Dec 2018 08:03    TPro  6.2    /  Alb  2.6    /  TBili  0.6    /  DBili  x      /  AST  24     /  ALT  33     /  AlkPhos  73     27 Dec 2018 06:12  TPro  7.6    /  Alb  3.0    /  TBili  0.6    /  DBili  x      /  AST  67     /  ALT  51     /  AlkPhos  113    26 Dec 2018 08:03    PT/INR - ( 26 Dec 2018 08:03 )   PT: 12.0 sec;   INR: 1.05 ratio         PTT - ( 26 Dec 2018 08:03 )  PTT:26.1 sec  CARDIAC MARKERS ( 26 Dec 2018 09:40 )  x     / x     / 80 U/L / x     / 7.1 ng/mL  CARDIAC MARKERS ( 26 Dec 2018 08:03 )  .375 ng/mL / x     / x     / x     / x             ECG:  < from: 12 Lead ECG (12.26.18 @ 08:33) >  Ventricular Rate 81 BPM    Atrial Rate 81 BPM    P-R Interval 164 ms    QRS Duration 168 ms    Q-T Interval 458 ms    QTC Calculation(Bezet) 532 ms    P Axis 59 degrees    R Axis -34 degrees    T Axis 110 degrees    Diagnosis Line Sinus rhythm withoccasional premature ventricular complexes and Possible premature atrial complexes with aberrant conduction  Left axis deviation  Left bundle branch block  Abnormal ECG  No previous ECGs available  Confirmed by Kaitlin Deutsch ([id number]) on 12/26/2018 10:08:18 AM    < end of copied text >    Echo:    Radiology:  < from: Xray Chest 1 View AP/PA (12.26.18 @ 08:36) >  EXAM:  XR CHEST AP OR PA 1V                            PROCEDURE DATE:  12/26/2018          INTERPRETATION:  History: Dyspnea    Technique:  AP portable    Comparisons:  none    Findings:     Patchy infiltrates at lung bases. Possible small left   pleural effusion.  .    The pulmonary vasculature and aorta are normal   for age. Cardiomegaly.    The thorax is normal for age.    Impression: Patchy infiltrates at lung bases. Small left pleural effusion   with cardiomegaly                VANESSA ZUÑIGA M.D., ATTENDING RADIOLOGIST  This document has been electronically signed. Dec 26 2018  8:42AM    < end of copied text >           Iraj Miles ANP   Cardiology [facility] -- Arlyn Hester, José Miguel, Amaris, Eddie Beltran Savella  Office # [phone]      Follow Up:    Elevated pro BNP, elevated trops  Subjective/Observations:   No events overnight resting comfortably in bed.  No complaints of chest pain, dyspnea, or palpitations reported. No signs of orthopnea or PND.     REVIEW OF SYSTEMS: All other review of systems is negative unless indicated above    PAST MEDICAL & SURGICAL HISTORY:  Dementia  Colon cancer  Constipation  H/O colon cancer, stage III: resection, colostomy      MEDICATIONS  (STANDING):  azithromycin  IVPB 500 milliGRAM(s) IV Intermittent every 24 hours  cefTRIAXone   IVPB 1 Gram(s) IV Intermittent every 24 hours  enoxaparin Injectable 30 milliGRAM(s) SubCutaneous daily  potassium chloride    Tablet ER 40 milliEquivalent(s) Oral once    MEDICATIONS  (PRN):  acetaminophen   Tablet .. 650 milliGRAM(s) Oral every 6 hours PRN Temp greater or equal to 38C (100.4F), Mild Pain (1 - 3)  aluminum hydroxide/magnesium hydroxide/simethicone Suspension 30 milliLiter(s) Oral every 4 hours PRN Dyspepsia  bisacodyl 5 milliGRAM(s) Oral every 12 hours PRN Constipation  guaiFENesin   Syrup  (Sugar-Free) 200 milliGRAM(s) Oral every 6 hours PRN Cough    Allergies    sulfa drugs (Rash)    Intolerances    Vital Signs Last 24 Hrs  T(C): 36.5 (27 Dec 2018 05:14), Max: 36.5 (26 Dec 2018 22:01)  T(F): 97.7 (27 Dec 2018 05:14), Max: 97.7 (26 Dec 2018 22:01)  HR: 64 (27 Dec 2018 05:50) (55 - 92)  BP: 184/82 (27 Dec 2018 05:50) (162/80 - 184/82)  BP(mean): 119 (26 Dec 2018 22:01) (119 - 119)  RR: 17 (27 Dec 2018 05:50) (16 - 22)  SpO2: 99% (27 Dec 2018 05:14) (93% - 100%)    I&O's Summary      PHYSICAL EXAM:  TELE: Off tele   Constitutional: NAD, , well-developed.  Asleep  HEENT: Moist Mucous Membranes, Anicteric  Pulmonary: Non-labored, breath sounds are diminished bilaterally, No wheezing, crackles or rhonchi  Cardiovascular: Regular, S1 and S2 nl, + murmurs.  No rubs, gallops or clicks  Gastrointestinal: Bowel Sounds present, soft, nontender.   Lymph: No lymphadenopathy. 1+ LE edema.  Skin: No visible rashes or ulcers.  Psych:  Mood & affect flat    LABS: All Labs Reviewed:                        11.0   8.62  )-----------( 197      ( 27 Dec 2018 06:12 )             33.6                         13.2   25.37 )-----------( 304      ( 26 Dec 2018 08:03 )             40.4     27 Dec 2018 06:12    146    |  108    |  28     ----------------------------<  82     3.3     |  33     |  0.93   26 Dec 2018 08:03    143    |  108    |  26     ----------------------------<  215    3.5     |  24     |  1.10     Ca    8.3        27 Dec 2018 06:12  Ca    8.6        26 Dec 2018 08:03    TPro  6.2    /  Alb  2.6    /  TBili  0.6    /  DBili  x      /  AST  24     /  ALT  33     /  AlkPhos  73     27 Dec 2018 06:12  TPro  7.6    /  Alb  3.0    /  TBili  0.6    /  DBili  x      /  AST  67     /  ALT  51     /  AlkPhos  113    26 Dec 2018 08:03    PT/INR - ( 26 Dec 2018 08:03 )   PT: 12.0 sec;   INR: 1.05 ratio         PTT - ( 26 Dec 2018 08:03 )  PTT:26.1 sec  CARDIAC MARKERS ( 26 Dec 2018 09:40 )  x     / x     / 80 U/L / x     / 7.1 ng/mL  CARDIAC MARKERS ( 26 Dec 2018 08:03 )  .375 ng/mL / x     / x     / x     / x             ECG:  < from: 12 Lead ECG (12.26.18 @ 08:33) >  Ventricular Rate 81 BPM    Atrial Rate 81 BPM    P-R Interval 164 ms    QRS Duration 168 ms    Q-T Interval 458 ms    QTC Calculation(Bezet) 532 ms    P Axis 59 degrees    R Axis -34 degrees    T Axis 110 degrees    Diagnosis Line Sinus rhythm withoccasional premature ventricular complexes and Possible premature atrial complexes with aberrant conduction  Left axis deviation  Left bundle branch block  Abnormal ECG  No previous ECGs available  Confirmed by Kaitlin Deutsch ([id number]) on 12/26/2018 10:08:18 AM    < end of copied text >    Echo:    Radiology:  < from: Xray Chest 1 View AP/PA (12.26.18 @ 08:36) >  EXAM:  XR CHEST AP OR PA 1V                            PROCEDURE DATE:  12/26/2018          INTERPRETATION:  History: Dyspnea    Technique:  AP portable    Comparisons:  none    Findings:     Patchy infiltrates at lung bases. Possible small left   pleural effusion.  .    The pulmonary vasculature and aorta are normal   for age. Cardiomegaly.    The thorax is normal for age.    Impression: Patchy infiltrates at lung bases. Small left pleural effusion   with cardiomegaly                VANESSA ZUÑIGA M.D., ATTENDING RADIOLOGIST  This document has been electronically signed. Dec 26 2018  8:42AM    < end of copied text >           Iraj Miles [facility]   Cardiology

## 2018-12-27 NOTE — SWALLOW BEDSIDE ASSESSMENT ADULT - COMMENTS
89 yo F admitted from NH with SOB and respiratory distress requiring Bi Pap c  pmhx dementia,  complete heart block (no pacemaker), HTN,  BIBEMS   Pt lethargic, eyes opened to verbal / tactile stimuli.  Per son and RN this AM, pt consumed 100% of AM breakfast tray   Pt c discoordinated breathe/ swallow pattern c untimely trigger of swallow c throat clear/ cough reflex response c increased SOB observed/ wheezing. RN and Dr Neff made aware. Pt at high aspiration risk c the most conservative PO textures

## 2018-12-27 NOTE — DIETITIAN INITIAL EVALUATION ADULT. - SIGNS/SYMPTOMS
as evidenced by abnormal swallow study 12/27 recommending NPO, dx PNA evidenced by abnormal swallow study 12/27 recommending NPO, dx PNA/acute resp failure, hx dementia.

## 2018-12-27 NOTE — DIETITIAN INITIAL EVALUATION ADULT. - OTHER INFO
Pt admitted with PNA, encephalopathy. S/p SLP evaluation (12/27) with recommendation for NPO. GI wdl, fecal incontinence noted. Right buttocks II, left 5th toe suspected DTI. Pt awake/confused at time of visit. Hx dementia. Dx PNA, encephalopathy. Puree diet ordered 12/27 per MD; 100% po intake for breakfast/lunch per RN aide. Full assistance/provided. S/p SLP evaluation (12/27) with recommendation for NPO. MD Neff aware of SLP recommendation. SLP to follow up 12/28. GI wdl, fecal incontinence noted. Right buttocks II, left 5th toe suspected DTI. Per MOLST pt DNR/DNI/No Tube Feeding, comfort measures only.

## 2018-12-27 NOTE — PROGRESS NOTE ADULT - SUBJECTIVE AND OBJECTIVE BOX
Patient is a 89y old  Female who presents with a chief complaint of encephalopathy, acute respiratory distress and failure requiring bipap (27 Dec 2018 12:14)      INTERVAL HPI: Pt seen and examined bedside, elderly frail, non verbal. Son at the bedside. tolerated BF this am    OVERNIGHT EVENTS:  T(F): 97.4 (12-27-18 @ 12:20), Max: 97.7 (12-26-18 @ 22:01)  HR: 89 (12-27-18 @ 12:20) (55 - 92)  BP: 182/101 (12-27-18 @ 12:20) (162/80 - 184/82)  RR: 18 (12-27-18 @ 12:20) (16 - 22)  SpO2: 93% (12-27-18 @ 12:20) (93% - 100%)  Wt(kg): --  I&O's Summary      REVIEW OF SYSTEM:    UTO 2/2 MS      PHYSICAL EXAM:  GENERAL: NAD, well-developed, frail  HEAD:  Atraumatic, Normocephalic  NECK: Supple, No JVD, Normal thyroid  HEART: Regular rate and rhythm; No murmurs, rubs, or gallops  RESPIRATORY: decreased BS B/L, No wheezing / rhonchi  ABDOMEN: Soft, Nontender, Nondistended; Bowel sounds present  NEUROLOGY: A&Ox3, nonfocal, moving all extremities.  EXTREMITIES:  2+ Peripheral Pulses, No clubbing, cyanosis, or edema  SKIN: warm, dry, normal color, no rash or abnormal lesions        LABS:                        11.0   8.62  )-----------( 197      ( 27 Dec 2018 06:12 )             33.6     12-27    146<H>  |  108  |  28<H>  ----------------------------<  82  3.3<L>   |  33<H>  |  0.93    Ca    8.3<L>      27 Dec 2018 06:12    TPro  6.2  /  Alb  2.6<L>  /  TBili  0.6  /  DBili  x   /  AST  24  /  ALT  33  /  AlkPhos  73  12-27    PT/INR - ( 26 Dec 2018 08:03 )   PT: 12.0 sec;   INR: 1.05 ratio         PTT - ( 26 Dec 2018 08:03 )  PTT:26.1 sec    CAPILLARY BLOOD GLUCOSE          12-26 @ 10:20   No growth to date.  --  --  12-26 @ 10:19   No growth to date.  --  --          MEDICATIONS  (STANDING):  azithromycin  IVPB 500 milliGRAM(s) IV Intermittent every 24 hours  cefTRIAXone   IVPB 1 Gram(s) IV Intermittent every 24 hours  enoxaparin Injectable 30 milliGRAM(s) SubCutaneous daily    MEDICATIONS  (PRN):  acetaminophen   Tablet .. 650 milliGRAM(s) Oral every 6 hours PRN Temp greater or equal to 38C (100.4F), Mild Pain (1 - 3)  aluminum hydroxide/magnesium hydroxide/simethicone Suspension 30 milliLiter(s) Oral every 4 hours PRN Dyspepsia  bisacodyl 5 milliGRAM(s) Oral every 12 hours PRN Constipation  guaiFENesin   Syrup  (Sugar-Free) 200 milliGRAM(s) Oral every 6 hours PRN Cough  morphine  - Injectable 1 milliGRAM(s) IV Push every 4 hours PRN dyspnea, distress, pain, palliative measures

## 2018-12-27 NOTE — PROGRESS NOTE ADULT - SUBJECTIVE AND OBJECTIVE BOX
Date/Time Patient Seen:  		  Referring MD:   Data Reviewed	       Patient is a 89y old  Female who presents with a chief complaint of encephalopathy, acute respiratory distress and failure requiring bipap (26 Dec 2018 10:00)      Subjective/HPI     PAST MEDICAL & SURGICAL HISTORY:  Dementia  Colon cancer  Constipation  H/O colon cancer, stage III: resection, colostomy        Medication list         MEDICATIONS  (STANDING):  azithromycin  IVPB 500 milliGRAM(s) IV Intermittent every 24 hours  cefTRIAXone   IVPB 1 Gram(s) IV Intermittent every 24 hours  enoxaparin Injectable 30 milliGRAM(s) SubCutaneous daily    MEDICATIONS  (PRN):  acetaminophen   Tablet .. 650 milliGRAM(s) Oral every 6 hours PRN Temp greater or equal to 38C (100.4F), Mild Pain (1 - 3)  aluminum hydroxide/magnesium hydroxide/simethicone Suspension 30 milliLiter(s) Oral every 4 hours PRN Dyspepsia  bisacodyl 5 milliGRAM(s) Oral every 12 hours PRN Constipation  guaiFENesin   Syrup  (Sugar-Free) 200 milliGRAM(s) Oral every 6 hours PRN Cough         Vitals log        ICU Vital Signs Last 24 Hrs  T(C): 36.5 (27 Dec 2018 05:14), Max: 37.2 (26 Dec 2018 08:27)  T(F): 97.7 (27 Dec 2018 05:14), Max: 98.9 (26 Dec 2018 08:27)  HR: 64 (27 Dec 2018 05:50) (55 - 96)  BP: 184/82 (27 Dec 2018 05:50) (135/64 - 184/82)  BP(mean): 119 (26 Dec 2018 22:01) (119 - 119)  ABP: --  ABP(mean): --  RR: 17 (27 Dec 2018 05:50) (16 - 22)  SpO2: 99% (27 Dec 2018 05:14) (91% - 100%)           Input and Output:  I&O's Detail      Lab Data                        11.0   8.62  )-----------( 197      ( 27 Dec 2018 06:12 )             33.6     12-26    143  |  108  |  26<H>  ----------------------------<  215<H>  3.5   |  24  |  1.10    Ca    8.6      26 Dec 2018 08:03    TPro  7.6  /  Alb  3.0<L>  /  TBili  0.6  /  DBili  x   /  AST  67<H>  /  ALT  51  /  AlkPhos  113  12-26      CARDIAC MARKERS ( 26 Dec 2018 09:40 )  x     / x     / 80 U/L / x     / 7.1 ng/mL  CARDIAC MARKERS ( 26 Dec 2018 08:03 )  .375 ng/mL / x     / x     / x     / x            Review of Systems	      Objective     Physical Examination    frail  weak  awake  lung dec BS  abd soft  severe dementia - confusion      Pertinent Lab findings & Imaging      Rush:  NO   Adequate UO     I&O's Detail           Discussed with:     Cultures:	        Radiology

## 2018-12-28 DIAGNOSIS — J18.9 PNEUMONIA, UNSPECIFIED ORGANISM: ICD-10-CM

## 2018-12-28 LAB
ANION GAP SERPL CALC-SCNC: 9 MMOL/L — SIGNIFICANT CHANGE UP (ref 5–17)
BUN SERPL-MCNC: 37 MG/DL — HIGH (ref 7–23)
CALCIUM SERPL-MCNC: 8.6 MG/DL — SIGNIFICANT CHANGE UP (ref 8.5–10.1)
CHLORIDE SERPL-SCNC: 113 MMOL/L — HIGH (ref 96–108)
CO2 SERPL-SCNC: 29 MMOL/L — SIGNIFICANT CHANGE UP (ref 22–31)
CREAT SERPL-MCNC: 0.97 MG/DL — SIGNIFICANT CHANGE UP (ref 0.5–1.3)
GLUCOSE SERPL-MCNC: 101 MG/DL — HIGH (ref 70–99)
HCT VFR BLD CALC: 37.2 % — SIGNIFICANT CHANGE UP (ref 34.5–45)
HGB BLD-MCNC: 12 G/DL — SIGNIFICANT CHANGE UP (ref 11.5–15.5)
MCHC RBC-ENTMCNC: 32.2 PG — SIGNIFICANT CHANGE UP (ref 27–34)
MCHC RBC-ENTMCNC: 32.3 GM/DL — SIGNIFICANT CHANGE UP (ref 32–36)
MCV RBC AUTO: 99.7 FL — SIGNIFICANT CHANGE UP (ref 80–100)
NRBC # BLD: 0 /100 WBCS — SIGNIFICANT CHANGE UP (ref 0–0)
PLATELET # BLD AUTO: 208 K/UL — SIGNIFICANT CHANGE UP (ref 150–400)
POTASSIUM SERPL-MCNC: 4.1 MMOL/L — SIGNIFICANT CHANGE UP (ref 3.5–5.3)
POTASSIUM SERPL-SCNC: 4.1 MMOL/L — SIGNIFICANT CHANGE UP (ref 3.5–5.3)
RBC # BLD: 3.73 M/UL — LOW (ref 3.8–5.2)
RBC # FLD: 13 % — SIGNIFICANT CHANGE UP (ref 10.3–14.5)
SODIUM SERPL-SCNC: 151 MMOL/L — HIGH (ref 135–145)
WBC # BLD: 11.18 K/UL — HIGH (ref 3.8–10.5)
WBC # FLD AUTO: 11.18 K/UL — HIGH (ref 3.8–10.5)

## 2018-12-28 PROCEDURE — 99233 SBSQ HOSP IP/OBS HIGH 50: CPT | Mod: 25

## 2018-12-28 PROCEDURE — 99497 ADVNCD CARE PLAN 30 MIN: CPT

## 2018-12-28 PROCEDURE — 99232 SBSQ HOSP IP/OBS MODERATE 35: CPT

## 2018-12-28 RX ORDER — AMLODIPINE BESYLATE 2.5 MG/1
5 TABLET ORAL DAILY
Qty: 0 | Refills: 0 | Status: DISCONTINUED | OUTPATIENT
Start: 2018-12-28 | End: 2018-12-31

## 2018-12-28 RX ADMIN — MORPHINE SULFATE 1 MILLIGRAM(S): 50 CAPSULE, EXTENDED RELEASE ORAL at 14:04

## 2018-12-28 RX ADMIN — AMLODIPINE BESYLATE 5 MILLIGRAM(S): 2.5 TABLET ORAL at 09:49

## 2018-12-28 RX ADMIN — ENOXAPARIN SODIUM 30 MILLIGRAM(S): 100 INJECTION SUBCUTANEOUS at 11:31

## 2018-12-28 RX ADMIN — MORPHINE SULFATE 1 MILLIGRAM(S): 50 CAPSULE, EXTENDED RELEASE ORAL at 06:08

## 2018-12-28 RX ADMIN — MORPHINE SULFATE 1 MILLIGRAM(S): 50 CAPSULE, EXTENDED RELEASE ORAL at 05:54

## 2018-12-28 RX ADMIN — MORPHINE SULFATE 1 MILLIGRAM(S): 50 CAPSULE, EXTENDED RELEASE ORAL at 14:20

## 2018-12-28 RX ADMIN — AZITHROMYCIN 255 MILLIGRAM(S): 500 TABLET, FILM COATED ORAL at 11:31

## 2018-12-28 RX ADMIN — CEFTRIAXONE 100 GRAM(S): 500 INJECTION, POWDER, FOR SOLUTION INTRAMUSCULAR; INTRAVENOUS at 11:31

## 2018-12-28 NOTE — PROGRESS NOTE ADULT - ASSESSMENT
89 yo female hx of complete heart block (no pacemaker), htn BIBEMS from private nursing facility c/o shortness of breath/possible aspiration pneumonia. EMS placed patient on cpap.  Found to be hypoxic to 80's at facility.  Has MOLST is DNR/DNI.   Here in ED WBC 25 lactate 5.7 Trop 0.375 proBNP [id number]. Chest X ray reveals patchy infiltrates at lung bases and a small left pleural effusion with cardiomegaly. EKG reveals sinus rhythm with LBBB and occasional premature ventricular complexes in a pattern of bigeminy vs possible premature atrial complexes with aberrant conduction.    - Doubt ACS. CE leak most likely in the setting of sepsis and lactic acidosis.  Lactate normalized after fluid resuscitation of 2 L  - No need to trend CE's  - The chronicity of her LBBB is unknown.   - Elevated pro BNP possibly volume overloaded, though, her exam does not support it.  S/p lasix 40 IV. Would try to keep net even, and continue to monitor volume status. If needed can repeat lasix 40 iv  - Echo shows severe LVF, EF 25-30%, severe AS, mild MR and pHTN  - Continue BiPAP and Abx. Oxygen supplementation as necessary.  Follow Pulm recs  - Monitor and replete lytes, keep K>4, Mg>2.  - Patient is a DNR/DNI and now on comfort care per son, who is a physician.

## 2018-12-28 NOTE — PROGRESS NOTE ADULT - SUBJECTIVE AND OBJECTIVE BOX
Patient is a 89y old  Female who presents with a chief complaint of encephalopathy, acute respiratory distress and failure requiring bipap (27 Dec 2018 12:14)      INTERVAL HPI: Pt seen and examined bedside, elderly frail, non verbal. Son at the bedside. Pt is lethrgic and failed s/s eval today. Pt was having tachy and tachypnea uesterday after feeds. ? aspiration. Pul did not start on BIPAP for Pts discomfort.   OVERNIGHT EVENTS:  Vital Signs Last 24 Hrs  T(C): 36.7 (28 Dec 2018 14:11), Max: 36.7 (28 Dec 2018 14:11)  T(F): 98 (28 Dec 2018 14:11), Max: 98 (28 Dec 2018 14:11)  HR: 91 (28 Dec 2018 14:11) (67 - 91)  BP: 183/126 (28 Dec 2018 14:11) (170/90 - 183/126)  BP(mean): --  RR: 17 (28 Dec 2018 14:11) (17 - 19)  SpO2: 99% (28 Dec 2018 14:11) (94% - 99%)    REVIEW OF SYSTEM:    [facility] 2/2 MS      PHYSICAL EXAM:  GENERAL: NAD, well-developed, frail, lethargic  HEAD:  Atraumatic, Normocephalic  NECK: Supple, No JVD, Normal thyroid  HEART: Regular rate and rhythm; No murmurs, rubs, or gallops  RESPIRATORY: decreased BS B/L, No wheezing / rhonchi  ABDOMEN: Soft, Nontender, Nondistended; Bowel sounds present  NEUROLOGY: A&Ox1, nonfocal, moving all extremities.  EXTREMITIES:  2+ Peripheral Pulses, No clubbing, cyanosis, or edema  SKIN: warm, dry, normal color, no rash or abnormal lesions        LABS:                        12.0   11.18 )-----------( 208      ( 28 Dec 2018 08:41 )             37.2     28 Dec 2018 08:41    151    |  113    |  37     ----------------------------<  101    4.1     |  29     |  0.97     Ca    8.6        28 Dec 2018 08:41          CAPILLARY BLOOD GLUCOSE        UCx       RADIOLOGY & ADDITIONAL TESTS:            12-26 @ 10:20   No growth to date.  --  --  12-26 @ 10:19   No growth to date.  --  --          MEDICATIONS  (STANDING):  azithromycin  IVPB 500 milliGRAM(s) IV Intermittent every 24 hours  cefTRIAXone   IVPB 1 Gram(s) IV Intermittent every 24 hours  enoxaparin Injectable 30 milliGRAM(s) SubCutaneous daily    MEDICATIONS  (PRN):  acetaminophen   Tablet .. 650 milliGRAM(s) Oral every 6 hours PRN Temp greater or equal to 38C (100.4F), Mild Pain (1 - 3)  aluminum hydroxide/magnesium hydroxide/simethicone Suspension 30 milliLiter(s) Oral every 4 hours PRN Dyspepsia  bisacodyl 5 milliGRAM(s) Oral every 12 hours PRN Constipation  guaiFENesin   Syrup  (Sugar-Free) 200 milliGRAM(s) Oral every 6 hours PRN Cough  morphine  - Injectable 1 milliGRAM(s) IV Push every 4 hours PRN dyspnea, distress, pain, palliative measures

## 2018-12-28 NOTE — PROGRESS NOTE ADULT - SUBJECTIVE AND OBJECTIVE BOX
[facility] -- Arlyn Hester, Amaris Valdez, Eddie Beltran Savella  Office # [phone]    Follow Up:  Respiratory distress    Subjective/Observations:     REVIEW OF SYSTEMS: All other review of systems is negative unless indicated above    PAST MEDICAL & SURGICAL HISTORY:  Dementia  Colon cancer  Constipation  H/O colon cancer, stage III: resection, colostomy    MEDICATIONS  (STANDING):  amLODIPine   Tablet 5 milliGRAM(s) Oral daily  azithromycin  IVPB 500 milliGRAM(s) IV Intermittent every 24 hours  cefTRIAXone   IVPB 1 Gram(s) IV Intermittent every 24 hours  enoxaparin Injectable 30 milliGRAM(s) SubCutaneous daily    MEDICATIONS  (PRN):  acetaminophen   Tablet .. 650 milliGRAM(s) Oral every 6 hours PRN Temp greater or equal to 38C (100.4F), Mild Pain (1 - 3)  aluminum hydroxide/magnesium hydroxide/simethicone Suspension 30 milliLiter(s) Oral every 4 hours PRN Dyspepsia  bisacodyl 5 milliGRAM(s) Oral every 12 hours PRN Constipation  guaiFENesin   Syrup  (Sugar-Free) 200 milliGRAM(s) Oral every 6 hours PRN Cough  morphine  - Injectable 1 milliGRAM(s) IV Push every 4 hours PRN dyspnea, distress, pain, palliative measures    Allergies    sulfa drugs (Rash)    Intolerances    Vital Signs Last 24 Hrs  T(C): 36.7 (28 Dec 2018 14:11), Max: 36.7 (28 Dec 2018 14:11)  T(F): 98 (28 Dec 2018 14:11), Max: 98 (28 Dec 2018 14:11)  HR: 91 (28 Dec 2018 14:11) (67 - 91)  BP: 183/126 (28 Dec 2018 14:11) (170/90 - 183/126)  BP(mean): --  RR: 17 (28 Dec 2018 14:11) (17 - 19)  SpO2: 99% (28 Dec 2018 14:11) (94% - 99%)    I&O's Summary      PHYSICAL EXAM:  TELE: Not on tele  Constitutional:   HEENT: Moist Mucous Membranes, Anicteric  Pulmonary: Non-labored, breath sounds are clear bilaterally, No wheezing, rales or rhonchi  Cardiovascular: Regular, S1 and S2, No murmurs, rubs, gallops or clicks  Gastrointestinal: Bowel Sounds present, soft, nontender.   Lymph: No peripheral edema. No lymphadenopathy.  Skin: No visible rashes or ulcers.  Psych:  Mood & affect flat    LABS: All Labs Reviewed:                        12.0   11.18 )-----------( 208      ( 28 Dec 2018 08:41 )             37.2                         11.0   8.62  )-----------( 197      ( 27 Dec 2018 06:12 )             33.6                         13.2   25.37 )-----------( 304      ( 26 Dec 2018 08:03 )             40.4     28 Dec 2018 08:41    151    |  113    |  37     ----------------------------<  101    4.1     |  29     |  0.97   27 Dec 2018 06:12    146    |  108    |  28     ----------------------------<  82     3.3     |  33     |  0.93   26 Dec 2018 08:03    143    |  108    |  26     ----------------------------<  215    3.5     |  24     |  1.10     Ca    8.6        28 Dec 2018 08:41  Ca    8.3        27 Dec 2018 06:12  Ca    8.6        26 Dec 2018 08:03    TPro  6.2    /  Alb  2.6    /  TBili  0.6    /  DBili  x      /  AST  24     /  ALT  33     /  AlkPhos  73     27 Dec 2018 06:12  TPro  7.6    /  Alb  3.0    /  TBili  0.6    /  DBili  x      /  AST  67     /  ALT  51     /  AlkPhos  113    26 Dec 2018 08:03    < from: TTE Echo Doppler w/o Cont (12.27.18 @ 16:49) >     EXAM:  ECHO TTE WO CON COMP W DOPPLR         PROCEDURE DATE:  12/27/2018        INTERPRETATION:  Ordering Physician: ZANDRA BRITO [id number]    Indication: Heart failure    Study Quality: Technically difficult   A complete echocardiographic study was performed utilizing standard   protocol including spectral and color Doppler in all echocardiographic   windows.    Weight: 72.6 kg  BSA: 1.7 sq m  Blood Pressure: 182/101    MEASUREMENTS  IVS: 1.4cm  PWT: 1.4cm  LA: 4.5cm  AO: 3.0cm  LVIDd: 5.5cm  LVIDs: 3.7cm  LVOT: 2.05cm    LVEF: 25-30%  RVSP: 56mmHg    FINDINGS  Left Ventricle: The endocardium is not well-visualized. Severe segmental   left ventricular systolic dysfunction. The lateral wall and anteroseptum   appear hypokinetic.  Aortic Valve: Calcified aortic valve with decreased opening. Severe   aortic stenosis. The peak aortic valve gradient is equal to 86 mmHg, and   the mean aortic valve gradient is equal to 43 mmHg. Estimated aortic   valve area is less than 0.5 sq cm.  Mitral Valve: Mitral annular calcification. Moderate mitral regurgitation.  Tricuspid Valve: Not well-visualized. Mild tricuspid regurgitation  Pulmonic Valve: Not well-visualized.  Left Atrium: Severe left atrial enlargement.  Right Ventricle: The right ventricle is not well visualized.  Right Atrium: Grossly normal.  Diastolic Function: Evidence of marked diastolic dysfunction. E/e' are   elevated suggestive of elevated left sided filling pressures.  Pericardium/Pleura: Bilateral pleural effusions. TheIVC is dilated at   2.41 cm. Trace pericardial effusion.  Hemodynamics: The pulmonary artery systolic pressure is estimated to be   56 mmHg, assuming the right atrial pressure is equal to 15 mmHg,   consistent with moderate pulmonary hypertension.    CONCLUSIONS:  1. Technically difficult and limited study.  2. The endocardium is not well-visualized. Severe segmental left   ventricular systolic dysfunction. Overall EF 25-30%.  3. Severe aortic stenosis.   4. Mitral annular calcification. Moderatemitral regurgitation.  5. Severe left atrial enlargement.  6. Evidence of marked diastolic dysfunction  7. Moderate pulmonary hypertension  8. Bilateral pleural effusions. The IVC is dilated at 2.41 cm. Trace   pericardial effusion.    No prior exam for comparison.    SARAY EDWARDS   This document has been electronically signed. Dec 28 2018  1:22PM      < end of copied text >    < from: Xray Chest 1 View AP/PA (12.26.18 @ 08:36) >    EXAM:  XR CHEST AP OR PA 1V                          PROCEDURE DATE:  12/26/2018      INTERPRETATION:  History: Dyspnea    Technique:  AP portable    Comparisons:  none    Findings:     Patchy infiltrates at lung bases. Possible small left   pleural effusion.  .    The pulmonary vasculature and aorta are normal   for age. Cardiomegaly.    The thorax is normal for age.    Impression: Patchy infiltrates at lung bases. Small left pleural effusion   with cardiomegaly    VANESSA ZUÑIGA M.D., ATTENDING RADIOLOGIST  This document has been electronically signed. Dec 26 2018  8:42AM      < end of copied text > [facility] -- Arlyn Hester, José Miguel, Amaris, Eddie Beltran Savella  Office # [phone]    Follow Up:  Respiratory distress    Subjective/Observations: sleeping comfortably, aspirating per son when eating  unable to obtain interval history.    REVIEW OF SYSTEMS: All other review of systems is negative unless indicated above    PAST MEDICAL & SURGICAL HISTORY:  Dementia  Colon cancer  Constipation  H/O colon cancer, stage III: resection, colostomy    MEDICATIONS  (STANDING):  amLODIPine   Tablet 5 milliGRAM(s) Oral daily  azithromycin  IVPB 500 milliGRAM(s) IV Intermittent every 24 hours  cefTRIAXone   IVPB 1 Gram(s) IV Intermittent every 24 hours  enoxaparin Injectable 30 milliGRAM(s) SubCutaneous daily    MEDICATIONS  (PRN):  acetaminophen   Tablet .. 650 milliGRAM(s) Oral every 6 hours PRN Temp greater or equal to 38C (100.4F), Mild Pain (1 - 3)  aluminum hydroxide/magnesium hydroxide/simethicone Suspension 30 milliLiter(s) Oral every 4 hours PRN Dyspepsia  bisacodyl 5 milliGRAM(s) Oral every 12 hours PRN Constipation  guaiFENesin   Syrup  (Sugar-Free) 200 milliGRAM(s) Oral every 6 hours PRN Cough  morphine  - Injectable 1 milliGRAM(s) IV Push every 4 hours PRN dyspnea, distress, pain, palliative measures    Allergies    sulfa drugs (Rash)    Intolerances    Vital Signs Last 24 Hrs  T(C): 36.7 (28 Dec 2018 14:11), Max: 36.7 (28 Dec 2018 14:11)  T(F): 98 (28 Dec 2018 14:11), Max: 98 (28 Dec 2018 14:11)  HR: 91 (28 Dec 2018 14:11) (67 - 91)  BP: 183/126 (28 Dec 2018 14:11) (170/90 - 183/126)  BP(mean): --  RR: 17 (28 Dec 2018 14:11) (17 - 19)  SpO2: 99% (28 Dec 2018 14:11) (94% - 99%)    I&O's Summary      PHYSICAL EXAM:  TELE: Not on tele  Constitutional: well-developed.  Asleep  HEENT: Moist Mucous Membranes, Anicteric  Pulmonary: + increased rr, decrease bs at bases  Cardiovascular: Regular, S1 and S2 nl, + faint systolic murmur.  No rubs, gallops or clicks  Gastrointestinal: Bowel Sounds present, soft, nontender.   Lymph: No lymphadenopathy. 1+ LE edema.  Skin: No visible rashes or ulcers.  Psych:  unable to assess    LABS: All Labs Reviewed:                        12.0 11.18 )-----------( 208      ( 28 Dec 2018 08:41 )             37.2                         11.0   8.62  )-----------( 197      ( 27 Dec 2018 06:12 )             33.6                         13.2   25.37 )-----------( 304      ( 26 Dec 2018 08:03 )             40.4     28 Dec 2018 08:41    151    |  113    |  37     ----------------------------<  101    4.1     |  29     |  0.97   27 Dec 2018 06:12    146    |  108    |  28     ----------------------------<  82     3.3     |  33     |  0.93   26 Dec 2018 08:03    143    |  108    |  26     ----------------------------<  215    3.5     |  24     |  1.10     Ca    8.6        28 Dec 2018 08:41  Ca    8.3        27 Dec 2018 06:12  Ca    8.6        26 Dec 2018 08:03    TPro  6.2    /  Alb  2.6    /  TBili  0.6    /  DBili  x      /  AST  24     /  ALT  33     /  AlkPhos  73     27 Dec 2018 06:12  TPro  7.6    /  Alb  3.0    /  TBili  0.6    /  DBili  x      /  AST  67     /  ALT  51     /  AlkPhos  113    26 Dec 2018 08:03    < from: TTE Echo Doppler w/o Cont (12.27.18 @ 16:49) >     EXAM:  ECHO TTE WO CON COMP W DOPPLR         PROCEDURE DATE:  12/27/2018        INTERPRETATION:  Ordering Physician: ZANDRA BRITO [id number]    Indication: Heart failure    Study Quality: Technically difficult   A complete echocardiographic study was performed utilizing standard   protocol including spectral and color Doppler in all echocardiographic   windows.    Weight: 72.6 kg  BSA: 1.7 sq m  Blood Pressure: 182/101    MEASUREMENTS  IVS: 1.4cm  PWT: 1.4cm  LA: 4.5cm  AO: 3.0cm  LVIDd: 5.5cm  LVIDs: 3.7cm  LVOT: 2.05cm    LVEF: 25-30%  RVSP: 56mmHg    FINDINGS  Left Ventricle: The endocardium is not well-visualized. Severe segmental   left ventricular systolic dysfunction. The lateral wall and anteroseptum   appear hypokinetic.  Aortic Valve: Calcified aortic valve with decreased opening. Severe   aortic stenosis. The peak aortic valve gradient is equal to 86 mmHg, and   the mean aortic valve gradient is equal to 43 mmHg. Estimated aortic   valve area is less than 0.5 sq cm.  Mitral Valve: Mitral annular calcification. Moderate mitral regurgitation.  Tricuspid Valve: Not well-visualized. Mild tricuspid regurgitation  Pulmonic Valve: Not well-visualized.  Left Atrium: Severe left atrial enlargement.  Right Ventricle: The right ventricle is not well visualized.  Right Atrium: Grossly normal.  Diastolic Function: Evidence of marked diastolic dysfunction. E/e' are   elevated suggestive of elevated left sided filling pressures.  Pericardium/Pleura: Bilateral pleural effusions. TheIVC is dilated at   2.41 cm. Trace pericardial effusion.  Hemodynamics: The pulmonary artery systolic pressure is estimated to be   56 mmHg, assuming the right atrial pressure is equal to 15 mmHg,   consistent with moderate pulmonary hypertension.    CONCLUSIONS:  1. Technically difficult and limited study.  2. The endocardium is not well-visualized. Severe segmental left   ventricular systolic dysfunction. Overall EF 25-30%.  3. Severe aortic stenosis.   4. Mitral annular calcification. Moderatemitral regurgitation.  5. Severe left atrial enlargement.  6. Evidence of marked diastolic dysfunction  7. Moderate pulmonary hypertension  8. Bilateral pleural effusions. The IVC is dilated at 2.41 cm. Trace   pericardial effusion.    No prior exam for comparison.    SARAY EDWARDS   This document has been electronically signed. Dec 28 2018  1:22PM      < end of copied text >    < from: Xray Chest 1 View AP/PA (12.26.18 @ 08:36) >    EXAM:  XR CHEST AP OR PA 1V                          PROCEDURE DATE:  12/26/2018      INTERPRETATION:  History: Dyspnea    Technique:  AP portable    Comparisons:  none    Findings:     Patchy infiltrates at lung bases. Possible small left   pleural effusion.  .    The pulmonary vasculature and aorta are normal   for age. Cardiomegaly.    The thorax is normal for age.    Impression: Patchy infiltrates at lung bases. Small left pleural effusion   with cardiomegaly    VANESSA ZUÑIGA M.D., ATTENDING RADIOLOGIST  This document has been electronically signed. Dec 26 2018  8:42AM      < end of copied text >

## 2018-12-28 NOTE — CHART NOTE - NSCHARTNOTEFT_GEN_A_CORE
Called by RN for hypertension. Patient seen and evaluated. Manual BP still elevated on repeat 179/80. Patient is not currently on any BP meds. Patient is nonverbal. BP repeated manually with several cuffs.    ROS: Unable to obtain    VITALS:  T(C): 36.3 (12-27-18 @ 20:39), Max: 36.5 (12-27-18 @ 05:14)  HR: 74 (12-27-18 @ 22:24) (55 - 89)  BP: 179/80 (12-27-18 @ 22:24) (179/80 - 184/82)  RR: 19 (12-27-18 @ 20:39) (16 - 19)  SpO2: 94% (12-27-18 @ 20:39) (93% - 99%)    PHYSICAL EXAM:  GENERAL: NAD, well-developed, frail  HEAD: Atraumatic, Normocephalic  NECK: Supple, No JVD, Normal thyroid  HEART: RRR; No murmurs, rubs, or gallops  RESPIRATORY: decreased BS B/L  ABDOMEN: Soft, Nontender, Nondistended; Bowel sounds present  NEUROLOGY: AOx0, opens eyes and looks at writer  EXTREMITIES: Palpable Peripheral Pulses, No clubbing, cyanosis, or edema  SKIN: warm, dry, normal color    ASSESSMENT:  89 yo F with pmh dementia, complete heart block (no pacemaker), htn BIBEMS from private nursing facility p/w sob and respiratory distress requiring bipap a/w acute respiratory distress sec to suspect asp pna and poss pulm edema questionable chf. Seen for HTN.    PLAN:  -treated previously overnight for hypertension  -hydralazine 10 mg PO x1  -Consider standing meds, as patient BP trend has been high  -Prognosis is guarded. As per pulm note, goals of care discussed with the family, only noninvasive measures to be used as per son who is a MD, however patient is not comfort care at this time.  -RN to monitor BP

## 2018-12-28 NOTE — PROGRESS NOTE ADULT - ASSESSMENT
89 yo F with pmh dementia,  complete heart block (no pacemaker), htn BIBEMS from private nursing facility p/w sob and respiratory distress requiring bipap a/w acute respiratory distress sec to suspect asp pna and poss pulm edema questionable chf

## 2018-12-28 NOTE — PROGRESS NOTE ADULT - SUBJECTIVE AND OBJECTIVE BOX
Date/Time Patient Seen:  		  Referring MD:   Data Reviewed	       Patient is a 89y old  Female who presents with a chief complaint of encephalopathy, acute respiratory distress and failure requiring bipap (27 Dec 2018 14:51)      Subjective/HPI     PAST MEDICAL & SURGICAL HISTORY:  Dementia  Colon cancer  Constipation  H/O colon cancer, stage III: resection, colostomy        Medication list         MEDICATIONS  (STANDING):  amLODIPine   Tablet 5 milliGRAM(s) Oral daily  azithromycin  IVPB 500 milliGRAM(s) IV Intermittent every 24 hours  cefTRIAXone   IVPB 1 Gram(s) IV Intermittent every 24 hours  enoxaparin Injectable 30 milliGRAM(s) SubCutaneous daily    MEDICATIONS  (PRN):  acetaminophen   Tablet .. 650 milliGRAM(s) Oral every 6 hours PRN Temp greater or equal to 38C (100.4F), Mild Pain (1 - 3)  aluminum hydroxide/magnesium hydroxide/simethicone Suspension 30 milliLiter(s) Oral every 4 hours PRN Dyspepsia  bisacodyl 5 milliGRAM(s) Oral every 12 hours PRN Constipation  guaiFENesin   Syrup  (Sugar-Free) 200 milliGRAM(s) Oral every 6 hours PRN Cough  morphine  - Injectable 1 milliGRAM(s) IV Push every 4 hours PRN dyspnea, distress, pain, palliative measures         Vitals log        ICU Vital Signs Last 24 Hrs  T(C): 36.4 (28 Dec 2018 06:03), Max: 36.4 (28 Dec 2018 06:03)  T(F): 97.6 (28 Dec 2018 06:03), Max: 97.6 (28 Dec 2018 06:03)  HR: 76 (28 Dec 2018 06:33) (72 - 89)  BP: 170/90 (28 Dec 2018 06:33) (170/90 - 182/101)  BP(mean): --  ABP: --  ABP(mean): --  RR: 17 (28 Dec 2018 06:33) (17 - 19)  SpO2: 98% (28 Dec 2018 06:03) (93% - 98%)           Input and Output:  I&O's Detail      Lab Data                        12.0   11.18 )-----------( 208      ( 28 Dec 2018 08:41 )             37.2     12-27    146<H>  |  108  |  28<H>  ----------------------------<  82  3.3<L>   |  33<H>  |  0.93    Ca    8.3<L>      27 Dec 2018 06:12    TPro  6.2  /  Alb  2.6<L>  /  TBili  0.6  /  DBili  x   /  AST  24  /  ALT  33  /  AlkPhos  73  12-27      CARDIAC MARKERS ( 26 Dec 2018 09:40 )  x     / x     / 80 U/L / x     / 7.1 ng/mL        Review of Systems	      Objective     Physical Examination    heart s1s2  lung dec BS      Pertinent Lab findings & Imaging      Adri:  NO   Adequate UO     I&O's Detail           Discussed with:     Cultures:	        Radiology

## 2018-12-29 LAB
ANION GAP SERPL CALC-SCNC: 5 MMOL/L — SIGNIFICANT CHANGE UP (ref 5–17)
BUN SERPL-MCNC: 41 MG/DL — HIGH (ref 7–23)
CALCIUM SERPL-MCNC: 8.7 MG/DL — SIGNIFICANT CHANGE UP (ref 8.5–10.1)
CHLORIDE SERPL-SCNC: 114 MMOL/L — HIGH (ref 96–108)
CO2 SERPL-SCNC: 31 MMOL/L — SIGNIFICANT CHANGE UP (ref 22–31)
CREAT SERPL-MCNC: 1.1 MG/DL — SIGNIFICANT CHANGE UP (ref 0.5–1.3)
GLUCOSE SERPL-MCNC: 141 MG/DL — HIGH (ref 70–99)
HCT VFR BLD CALC: 36.5 % — SIGNIFICANT CHANGE UP (ref 34.5–45)
HGB BLD-MCNC: 11.9 G/DL — SIGNIFICANT CHANGE UP (ref 11.5–15.5)
MCHC RBC-ENTMCNC: 32.6 GM/DL — SIGNIFICANT CHANGE UP (ref 32–36)
MCHC RBC-ENTMCNC: 32.8 PG — SIGNIFICANT CHANGE UP (ref 27–34)
MCV RBC AUTO: 100.6 FL — HIGH (ref 80–100)
NRBC # BLD: 0 /100 WBCS — SIGNIFICANT CHANGE UP (ref 0–0)
PLATELET # BLD AUTO: 217 K/UL — SIGNIFICANT CHANGE UP (ref 150–400)
POTASSIUM SERPL-MCNC: 3.6 MMOL/L — SIGNIFICANT CHANGE UP (ref 3.5–5.3)
POTASSIUM SERPL-SCNC: 3.6 MMOL/L — SIGNIFICANT CHANGE UP (ref 3.5–5.3)
RBC # BLD: 3.63 M/UL — LOW (ref 3.8–5.2)
RBC # FLD: 13 % — SIGNIFICANT CHANGE UP (ref 10.3–14.5)
SODIUM SERPL-SCNC: 150 MMOL/L — HIGH (ref 135–145)
WBC # BLD: 11.29 K/UL — HIGH (ref 3.8–10.5)
WBC # FLD AUTO: 11.29 K/UL — HIGH (ref 3.8–10.5)

## 2018-12-29 PROCEDURE — 99232 SBSQ HOSP IP/OBS MODERATE 35: CPT

## 2018-12-29 PROCEDURE — 99233 SBSQ HOSP IP/OBS HIGH 50: CPT

## 2018-12-29 RX ORDER — AZITHROMYCIN 500 MG/1
500 TABLET, FILM COATED ORAL ONCE
Qty: 0 | Refills: 0 | Status: COMPLETED | OUTPATIENT
Start: 2018-12-30 | End: 2018-12-30

## 2018-12-29 RX ADMIN — MORPHINE SULFATE 1 MILLIGRAM(S): 50 CAPSULE, EXTENDED RELEASE ORAL at 13:15

## 2018-12-29 RX ADMIN — AMLODIPINE BESYLATE 5 MILLIGRAM(S): 2.5 TABLET ORAL at 05:28

## 2018-12-29 RX ADMIN — AZITHROMYCIN 255 MILLIGRAM(S): 500 TABLET, FILM COATED ORAL at 12:21

## 2018-12-29 RX ADMIN — MORPHINE SULFATE 1 MILLIGRAM(S): 50 CAPSULE, EXTENDED RELEASE ORAL at 06:57

## 2018-12-29 RX ADMIN — ENOXAPARIN SODIUM 30 MILLIGRAM(S): 100 INJECTION SUBCUTANEOUS at 12:20

## 2018-12-29 RX ADMIN — CEFTRIAXONE 100 GRAM(S): 500 INJECTION, POWDER, FOR SOLUTION INTRAMUSCULAR; INTRAVENOUS at 12:20

## 2018-12-29 RX ADMIN — MORPHINE SULFATE 1 MILLIGRAM(S): 50 CAPSULE, EXTENDED RELEASE ORAL at 01:41

## 2018-12-29 NOTE — PROGRESS NOTE ADULT - SUBJECTIVE AND OBJECTIVE BOX
[facility] -- Arlyn Hester, José Miguel, Amaris, Eddie Beltran Savella  Office # [phone]      Follow Up:    Respiratory distress  Subjective/Observations:   No events overnight resting comfortably in bed.  No complaints of chest pain, dyspnea, or palpitations reported. No signs of orthopnea or PND.  Son at bedside assisting with breakfast    REVIEW OF SYSTEMS: All other review of systems is negative unless indicated above    PAST MEDICAL & SURGICAL HISTORY:  Dementia  Colon cancer  Constipation  H/O colon cancer, stage III: resection, colostomy      MEDICATIONS  (STANDING):  amLODIPine   Tablet 5 milliGRAM(s) Oral daily  cefTRIAXone   IVPB 1 Gram(s) IV Intermittent every 24 hours  enoxaparin Injectable 30 milliGRAM(s) SubCutaneous daily    MEDICATIONS  (PRN):  acetaminophen   Tablet .. 650 milliGRAM(s) Oral every 6 hours PRN Temp greater or equal to 38C (100.4F), Mild Pain (1 - 3)  aluminum hydroxide/magnesium hydroxide/simethicone Suspension 30 milliLiter(s) Oral every 4 hours PRN Dyspepsia  bisacodyl 5 milliGRAM(s) Oral every 12 hours PRN Constipation  guaiFENesin   Syrup  (Sugar-Free) 200 milliGRAM(s) Oral every 6 hours PRN Cough  morphine  - Injectable 1 milliGRAM(s) IV Push every 4 hours PRN dyspnea, distress, pain, palliative measures      Allergies    sulfa drugs (Rash)    Intolerances        Vital Signs Last 24 Hrs  T(C): 36.7 (29 Dec 2018 13:18), Max: 36.8 (28 Dec 2018 21:43)  T(F): 98.1 (29 Dec 2018 13:18), Max: 98.2 (28 Dec 2018 21:43)  HR: 86 (29 Dec 2018 13:18) (74 - 86)  BP: 167/99 (29 Dec 2018 13:18) (163/91 - 182/104)  BP(mean): --  RR: 16 (29 Dec 2018 13:18) (16 - 18)  SpO2: 100% (29 Dec 2018 13:18) (95% - 100%)    I&O's Summary    28 Dec 2018 07:01  -  29 Dec 2018 07:00  --------------------------------------------------------  IN: 350 mL / OUT: 0 mL / NET: 350 mL          PHYSICAL EXAM:  TELE: Off tele   Constitutional: NAD, awake and alert, well-developed  HEENT: Moist Mucous Membranes, Anicteric  Pulmonary: Non-labored, breath sounds are diminished at bases bilaterally , No wheezing, crackles or rhonchi  Cardiovascular: Regular, S1 and S2 nl, No murmurs, rubs, gallops or clicks  Gastrointestinal: Bowel Sounds present, soft, nontender.   Lymph: No lymphadenopathy. No peripheral edema.  Skin: No visible rashes or ulcers.  Psych:  Mood & affect appropriate    LABS: All Labs Reviewed:                        11.9   11.29 )-----------( 217      ( 29 Dec 2018 10:11 )             36.5                         12.0   11.18 )-----------( 208      ( 28 Dec 2018 08:41 )             37.2                         11.0   8.62  )-----------( 197      ( 27 Dec 2018 06:12 )             33.6     29 Dec 2018 10:11    150    |  114    |  41     ----------------------------<  141    3.6     |  31     |  1.10   28 Dec 2018 08:41    151    |  113    |  37     ----------------------------<  101    4.1     |  29     |  0.97   27 Dec 2018 06:12    146    |  108    |  28     ----------------------------<  82     3.3     |  33     |  0.93     Ca    8.7        29 Dec 2018 10:11  Ca    8.6        28 Dec 2018 08:41  Ca    8.3        27 Dec 2018 06:12    TPro  6.2    /  Alb  2.6    /  TBili  0.6    /  DBili  x      /  AST  24     /  ALT  33     /  AlkPhos  73     27 Dec 2018 06:12             ECG:  < from: 12 Lead ECG (12.26.18 @ 08:33) >  Ventricular Rate 81 BPM    Atrial Rate 81 BPM    P-R Interval 164 ms    QRS Duration 168 ms    Q-T Interval 458 ms    QTC Calculation(Bezet) 532 ms    P Axis 59 degrees    R Axis -34 degrees    T Axis 110 degrees    Diagnosis Line Sinus rhythm withoccasional premature ventricular complexes and Possible premature atrial complexes with aberrant conduction  Left axis deviation  Left bundle branch block  Abnormal ECG  No previous ECGs available  Confirmed by Kaitlin Deutsch ([id number]) on 12/26/2018 10:08:18 AM    < end of copied text >    Echo:  < from: TTE Echo Doppler w/o Cont (12.27.18 @ 16:49) >  EXAM:  ECHO TTE WO CON COMP W DOPPLR         PROCEDURE DATE:  12/27/2018        INTERPRETATION:  Ordering Physician: ZANDRA BRITO [id number]    Indication: Heart failure    Study Quality: Technically difficult   A complete echocardiographic study was performed utilizing standard   protocol including spectral and color Doppler in all echocardiographic   windows.    Weight: 72.6 kg  BSA: 1.7 sq m  Blood Pressure: 182/101    MEASUREMENTS  IVS: 1.4cm  PWT: 1.4cm  LA: 4.5cm  AO: 3.0cm  LVIDd: 5.5cm  LVIDs: 3.7cm  LVOT: 2.05cm    LVEF: 25-30%  RVSP: 56mmHg    FINDINGS  Left Ventricle: The endocardium is not well-visualized. Severe segmental   left ventricular systolic dysfunction. The lateral wall and anteroseptum   appear hypokinetic.  Aortic Valve: Calcified aortic valve with decreased opening. Severe   aortic stenosis. The peak aortic valve gradient is equal to 86 mmHg, and   the mean aortic valve gradient is equal to 43 mmHg. Estimated aortic   valve area is less than 0.5 sq cm.  Mitral Valve: Mitral annular calcification. Moderate mitral regurgitation.  Tricuspid Valve: Not well-visualized. Mild tricuspid regurgitation  Pulmonic Valve: Not well-visualized.  Left Atrium: Severe left atrial enlargement.  Right Ventricle: The right ventricle is not well visualized.  Right Atrium: Grossly normal.  Diastolic Function: Evidence of marked diastolic dysfunction. E/e' are   elevated suggestive of elevated left sided filling pressures.  Pericardium/Pleura: Bilateral pleural effusions. TheIVC is dilated at   2.41 cm. Trace pericardial effusion.  Hemodynamics: The pulmonary artery systolic pressure is estimated to be   56 mmHg, assuming the right atrial pressure is equal to 15 mmHg,   consistent with moderate pulmonary hypertension.    CONCLUSIONS:  1. Technically difficult and limited study.  2. The endocardium is not well-visualized. Severe segmental left   ventricular systolic dysfunction. Overall EF 25-30%.  3. Severe aortic stenosis.   4. Mitral annular calcification. Moderatemitral regurgitation.  5. Severe left atrial enlargement.  6. Evidence of marked diastolic dysfunction  7. Moderate pulmonary hypertension  8. Bilateral pleural effusions. The IVC is dilated at 2.41 cm. Trace   pericardial effusion.    No prior exam for comparison.    < end of copied text >    Radiology:  < from: Xray Chest 1 View AP/PA (12.26.18 @ 08:36) >  EXAM:  XR CHEST AP OR PA 1V                            PROCEDURE DATE:  12/26/2018          INTERPRETATION:  History: Dyspnea    Technique:  AP portable    Comparisons:  none    Findings:     Patchy infiltrates at lung bases. Possible small left   pleural effusion.  .    The pulmonary vasculature and aorta are normal   for age. Cardiomegaly.    The thorax is normal for age.    Impression: Patchy infiltrates at lung bases. Small left pleural effusion   with cardiomegaly                VANESSA ZUÑIGA M.D., ATTENDING RADIOLOGIST  This document has been electronically signed. Dec 26 2018  8:42AM                < end of copied text >           [facility]   Cardiology [facility] -- Arlyn Hester, José Miguel, Amaris, Eddie Beltran Savella  Office # [phone]      Follow Up:    Respiratory distress    Subjective/Observations:   No events overnight resting comfortably in bed.  No complaints of chest pain, dyspnea, or palpitations reported. No signs of orthopnea or PND.  Son at bedside assisting with breakfast    REVIEW OF SYSTEMS: All other review of systems is negative unless indicated above    PAST MEDICAL & SURGICAL HISTORY:  Dementia  Colon cancer  Constipation  H/O colon cancer, stage III: resection, colostomy      MEDICATIONS  (STANDING):  amLODIPine   Tablet 5 milliGRAM(s) Oral daily  cefTRIAXone   IVPB 1 Gram(s) IV Intermittent every 24 hours  enoxaparin Injectable 30 milliGRAM(s) SubCutaneous daily    MEDICATIONS  (PRN):  acetaminophen   Tablet .. 650 milliGRAM(s) Oral every 6 hours PRN Temp greater or equal to 38C (100.4F), Mild Pain (1 - 3)  aluminum hydroxide/magnesium hydroxide/simethicone Suspension 30 milliLiter(s) Oral every 4 hours PRN Dyspepsia  bisacodyl 5 milliGRAM(s) Oral every 12 hours PRN Constipation  guaiFENesin   Syrup  (Sugar-Free) 200 milliGRAM(s) Oral every 6 hours PRN Cough  morphine  - Injectable 1 milliGRAM(s) IV Push every 4 hours PRN dyspnea, distress, pain, palliative measures      Allergies    sulfa drugs (Rash)    Intolerances        Vital Signs Last 24 Hrs  T(C): 36.7 (29 Dec 2018 13:18), Max: 36.8 (28 Dec 2018 21:43)  T(F): 98.1 (29 Dec 2018 13:18), Max: 98.2 (28 Dec 2018 21:43)  HR: 86 (29 Dec 2018 13:18) (74 - 86)  BP: 167/99 (29 Dec 2018 13:18) (163/91 - 182/104)  BP(mean): --  RR: 16 (29 Dec 2018 13:18) (16 - 18)  SpO2: 100% (29 Dec 2018 13:18) (95% - 100%)    I&O's Summary    28 Dec 2018 07:01  -  29 Dec 2018 07:00  --------------------------------------------------------  IN: 350 mL / OUT: 0 mL / NET: 350 mL          PHYSICAL EXAM:  TELE: Off tele   Constitutional: NAD, awake and alert, well-developed  HEENT: Moist Mucous Membranes, Anicteric  Pulmonary: Non-labored, breath sounds are diminished at bases bilaterally , No wheezing, crackles or rhonchi  Cardiovascular: Regular, S1 and S2 nl, No murmurs, rubs, gallops or clicks  Gastrointestinal: Bowel Sounds present, soft, nontender.   Lymph: No lymphadenopathy. No peripheral edema.  Skin: No visible rashes or ulcers.  Psych:  Mood & affect appropriate    LABS: All Labs Reviewed:                        11.9   11.29 )-----------( 217      ( 29 Dec 2018 10:11 )             36.5                         12.0   11.18 )-----------( 208      ( 28 Dec 2018 08:41 )             37.2                         11.0   8.62  )-----------( 197      ( 27 Dec 2018 06:12 )             33.6     29 Dec 2018 10:11    150    |  114    |  41     ----------------------------<  141    3.6     |  31     |  1.10   28 Dec 2018 08:41    151    |  113    |  37     ----------------------------<  101    4.1     |  29     |  0.97   27 Dec 2018 06:12    146    |  108    |  28     ----------------------------<  82     3.3     |  33     |  0.93     Ca    8.7        29 Dec 2018 10:11  Ca    8.6        28 Dec 2018 08:41  Ca    8.3        27 Dec 2018 06:12    TPro  6.2    /  Alb  2.6    /  TBili  0.6    /  DBili  x      /  AST  24     /  ALT  33     /  AlkPhos  73     27 Dec 2018 06:12             ECG:  < from: 12 Lead ECG (12.26.18 @ 08:33) >  Ventricular Rate 81 BPM    Atrial Rate 81 BPM    P-R Interval 164 ms    QRS Duration 168 ms    Q-T Interval 458 ms    QTC Calculation(Bezet) 532 ms    P Axis 59 degrees    R Axis -34 degrees    T Axis 110 degrees    Diagnosis Line Sinus rhythm withoccasional premature ventricular complexes and Possible premature atrial complexes with aberrant conduction  Left axis deviation  Left bundle branch block  Abnormal ECG  No previous ECGs available  Confirmed by Kaitlin Deutsch ([id number]) on 12/26/2018 10:08:18 AM    < end of copied text >    Echo:  < from: TTE Echo Doppler w/o Cont (12.27.18 @ 16:49) >  EXAM:  ECHO TTE WO CON COMP W DOPPLR         PROCEDURE DATE:  12/27/2018        INTERPRETATION:  Ordering Physician: ZANDRA BRITO [id number]    Indication: Heart failure    Study Quality: Technically difficult   A complete echocardiographic study was performed utilizing standard   protocol including spectral and color Doppler in all echocardiographic   windows.    Weight: 72.6 kg  BSA: 1.7 sq m  Blood Pressure: 182/101    MEASUREMENTS  IVS: 1.4cm  PWT: 1.4cm  LA: 4.5cm  AO: 3.0cm  LVIDd: 5.5cm  LVIDs: 3.7cm  LVOT: 2.05cm    LVEF: 25-30%  RVSP: 56mmHg    FINDINGS  Left Ventricle: The endocardium is not well-visualized. Severe segmental   left ventricular systolic dysfunction. The lateral wall and anteroseptum   appear hypokinetic.  Aortic Valve: Calcified aortic valve with decreased opening. Severe   aortic stenosis. The peak aortic valve gradient is equal to 86 mmHg, and   the mean aortic valve gradient is equal to 43 mmHg. Estimated aortic   valve area is less than 0.5 sq cm.  Mitral Valve: Mitral annular calcification. Moderate mitral regurgitation.  Tricuspid Valve: Not well-visualized. Mild tricuspid regurgitation  Pulmonic Valve: Not well-visualized.  Left Atrium: Severe left atrial enlargement.  Right Ventricle: The right ventricle is not well visualized.  Right Atrium: Grossly normal.  Diastolic Function: Evidence of marked diastolic dysfunction. E/e' are   elevated suggestive of elevated left sided filling pressures.  Pericardium/Pleura: Bilateral pleural effusions. TheIVC is dilated at   2.41 cm. Trace pericardial effusion.  Hemodynamics: The pulmonary artery systolic pressure is estimated to be   56 mmHg, assuming the right atrial pressure is equal to 15 mmHg,   consistent with moderate pulmonary hypertension.    CONCLUSIONS:  1. Technically difficult and limited study.  2. The endocardium is not well-visualized. Severe segmental left   ventricular systolic dysfunction. Overall EF 25-30%.  3. Severe aortic stenosis.   4. Mitral annular calcification. Moderatemitral regurgitation.  5. Severe left atrial enlargement.  6. Evidence of marked diastolic dysfunction  7. Moderate pulmonary hypertension  8. Bilateral pleural effusions. The IVC is dilated at 2.41 cm. Trace   pericardial effusion.    No prior exam for comparison.    < end of copied text >    Radiology:  < from: Xray Chest 1 View AP/PA (12.26.18 @ 08:36) >  EXAM:  XR CHEST AP OR PA 1V                            PROCEDURE DATE:  12/26/2018          INTERPRETATION:  History: Dyspnea    Technique:  AP portable    Comparisons:  none    Findings:     Patchy infiltrates at lung bases. Possible small left   pleural effusion.  .    The pulmonary vasculature and aorta are normal   for age. Cardiomegaly.    The thorax is normal for age.    Impression: Patchy infiltrates at lung bases. Small left pleural effusion   with cardiomegaly                VANESSA ZUÑIGA M.D., ATTENDING RADIOLOGIST  This document has been electronically signed. Dec 26 2018  8:42AM                < end of copied text >           [facility]   Cardiology

## 2018-12-29 NOTE — PROGRESS NOTE ADULT - SUBJECTIVE AND OBJECTIVE BOX
Date/Time Patient Seen:  		  Referring MD:   Data Reviewed	       Patient is a 89y old  Female who presents with a chief complaint of encephalopathy, acute respiratory distress and failure requiring bipap (28 Dec 2018 16:19)      Subjective/HPI     PAST MEDICAL & SURGICAL HISTORY:  Dementia  Colon cancer  Constipation  H/O colon cancer, stage III: resection, colostomy        Medication list         MEDICATIONS  (STANDING):  amLODIPine   Tablet 5 milliGRAM(s) Oral daily  azithromycin  IVPB 500 milliGRAM(s) IV Intermittent every 24 hours  cefTRIAXone   IVPB 1 Gram(s) IV Intermittent every 24 hours  enoxaparin Injectable 30 milliGRAM(s) SubCutaneous daily    MEDICATIONS  (PRN):  acetaminophen   Tablet .. 650 milliGRAM(s) Oral every 6 hours PRN Temp greater or equal to 38C (100.4F), Mild Pain (1 - 3)  aluminum hydroxide/magnesium hydroxide/simethicone Suspension 30 milliLiter(s) Oral every 4 hours PRN Dyspepsia  bisacodyl 5 milliGRAM(s) Oral every 12 hours PRN Constipation  guaiFENesin   Syrup  (Sugar-Free) 200 milliGRAM(s) Oral every 6 hours PRN Cough  morphine  - Injectable 1 milliGRAM(s) IV Push every 4 hours PRN dyspnea, distress, pain, palliative measures         Vitals log        ICU Vital Signs Last 24 Hrs  T(C): 36.6 (29 Dec 2018 05:15), Max: 36.8 (28 Dec 2018 21:43)  T(F): 97.8 (29 Dec 2018 05:15), Max: 98.2 (28 Dec 2018 21:43)  HR: 84 (29 Dec 2018 05:15) (67 - 91)  BP: 182/104 (29 Dec 2018 05:15) (163/91 - 183/126)  BP(mean): --  ABP: --  ABP(mean): --  RR: 18 (29 Dec 2018 05:15) (17 - 18)  SpO2: 95% (29 Dec 2018 05:15) (95% - 99%)           Input and Output:  I&O's Detail    28 Dec 2018 07:01  -  29 Dec 2018 06:57  --------------------------------------------------------  IN:    IV PiggyBack: 350 mL  Total IN: 350 mL    OUT:  Total OUT: 0 mL    Total NET: 350 mL          Lab Data                        12.0   11.18 )-----------( 208      ( 28 Dec 2018 08:41 )             37.2     12-28    151<H>  |  113<H>  |  37<H>  ----------------------------<  101<H>  4.1   |  29  |  0.97    Ca    8.6      28 Dec 2018 08:41              Review of Systems	      Objective     Physical Examination    frail  weak  confused  lung dec BS  abd soft      Pertinent Lab findings & Imaging      Adri:  NO   Adequate UO     I&O's Detail    28 Dec 2018 07:01  -  29 Dec 2018 06:57  --------------------------------------------------------  IN:    IV PiggyBack: 350 mL  Total IN: 350 mL    OUT:  Total OUT: 0 mL    Total NET: 350 mL               Discussed with:     Cultures:	        Radiology

## 2018-12-29 NOTE — PROGRESS NOTE ADULT - ASSESSMENT
89 yo female hx of complete heart block (no pacemaker), htn BIBEMS from private nursing facility c/o shortness of breath/possible aspiration pneumonia. EMS placed patient on cpap.  Found to be hypoxic to 80's at facility.  Has MOLST is DNR/DNI.   Here in ED WBC 25 lactate 5.7 Trop 0.375 proBNP 82821. Chest X ray reveals patchy infiltrates at lung bases and a small left pleural effusion with cardiomegaly. EKG reveals sinus rhythm with LBBB and occasional premature ventricular complexes in a pattern of bigeminy vs possible premature atrial complexes with aberrant conduction.    - Doubt ACS. CE leak most likely in the setting of sepsis and lactic acidosis.  Lactate normalized after fluid resuscitation of 2 L  - No need to trend CE's  - The chronicity of her LBBB is unknown.   - Elevated pro BNP possibly volume overloaded, though, her exam does not support it.   Would try to keep net even, and continue to monitor volume status. If needed can repeat lasix 40 iv  - Echo shows severe LVF, EF 25-30%, severe AS, mild MR and pHTN  - Continue BiPAP and Abx. Oxygen supplementation as necessary.  Follow Pulm recs  - Monitor and replete lytes, keep K>4, Mg>2.  - Patient is a DNR/DNI and now on comfort care per son, who is a physician.  [facility]  Cardiology

## 2018-12-29 NOTE — PROGRESS NOTE ADULT - SUBJECTIVE AND OBJECTIVE BOX
Patient is a 89y old  Female who presents with a chief complaint of encephalopathy, acute respiratory distress and failure requiring bipap (29 Dec 2018 06:57)       INTERVAL HPI/OVERNIGHT EVENTS: Patient seen and examined at bedside. Lethargic.     MEDICATIONS  (STANDING):  amLODIPine   Tablet 5 milliGRAM(s) Oral daily  azithromycin  IVPB 500 milliGRAM(s) IV Intermittent every 24 hours  cefTRIAXone   IVPB 1 Gram(s) IV Intermittent every 24 hours  enoxaparin Injectable 30 milliGRAM(s) SubCutaneous daily    MEDICATIONS  (PRN):  acetaminophen   Tablet .. 650 milliGRAM(s) Oral every 6 hours PRN Temp greater or equal to 38C (100.4F), Mild Pain (1 - 3)  aluminum hydroxide/magnesium hydroxide/simethicone Suspension 30 milliLiter(s) Oral every 4 hours PRN Dyspepsia  bisacodyl 5 milliGRAM(s) Oral every 12 hours PRN Constipation  guaiFENesin   Syrup  (Sugar-Free) 200 milliGRAM(s) Oral every 6 hours PRN Cough  morphine  - Injectable 1 milliGRAM(s) IV Push every 4 hours PRN dyspnea, distress, pain, palliative measures      Allergies    sulfa drugs (Rash)    Intolerances        REVIEW OF SYSTEMS:  Unable to obtain secondary to patient lethargic  Vital Signs Last 24 Hrs  T(C): 36.6 (29 Dec 2018 05:15), Max: 36.8 (28 Dec 2018 21:43)  T(F): 97.8 (29 Dec 2018 05:15), Max: 98.2 (28 Dec 2018 21:43)  HR: 84 (29 Dec 2018 05:15) (74 - 91)  BP: 182/104 (29 Dec 2018 05:15) (163/91 - 183/126)  BP(mean): --  RR: 18 (29 Dec 2018 05:15) (17 - 18)  SpO2: 95% (29 Dec 2018 05:15) (95% - 99%)    PHYSICAL EXAM:  GENERAL: Lethargic, Frail   HEAD:  Atraumatic, Normocephalic  EYES: EOMI, PERRLA, conjunctiva and sclera clear  ENMT: No tonsillar erythema, exudates, or enlargement  NECK: Supple, No JVD, Normal thyroid  CHEST/LUNG: Decrease  to auscultation bilaterally; No rales, rhonchi, wheezing, or rubs  HEART:S1S2+,  Regular rate and rhythm  ABDOMEN: Soft, Nontender, Nondistended; Bowel sounds present  EXTREMITIES:  2+ Peripheral Pulses, No clubbing, cyanosis, or edema  LYMPH: No lymphadenopathy noted    LABS:                        11.9   11.29 )-----------( 217      ( 29 Dec 2018 10:11 )             36.5     29 Dec 2018 10:11    150    |  114    |  41     ----------------------------<  141    3.6     |  31     |  1.10     Ca    8.7        29 Dec 2018 10:11        CAPILLARY BLOOD GLUCOSE      POCT Blood Glucose.: 153 mg/dL (29 Dec 2018 01:20)    BLOOD CULTURE  12-26 @ 10:20   No growth to date.  --  --  12-26 @ 10:19   No growth to date.  --  --    RADIOLOGY & ADDITIONAL TESTS:    Imaging Personally Reviewed:  [ ] YES     Consultant(s) Notes Reviewed:      Care Discussed with Consultants/Other Providers:

## 2018-12-30 PROCEDURE — 99233 SBSQ HOSP IP/OBS HIGH 50: CPT

## 2018-12-30 PROCEDURE — 99232 SBSQ HOSP IP/OBS MODERATE 35: CPT

## 2018-12-30 RX ORDER — MORPHINE SULFATE 50 MG/1
5 CAPSULE, EXTENDED RELEASE ORAL
Qty: 0 | Refills: 0 | Status: DISCONTINUED | OUTPATIENT
Start: 2018-12-30 | End: 2018-12-31

## 2018-12-30 RX ORDER — MORPHINE SULFATE 50 MG/1
2.5 CAPSULE, EXTENDED RELEASE ORAL ONCE
Qty: 0 | Refills: 0 | Status: DISCONTINUED | OUTPATIENT
Start: 2018-12-30 | End: 2018-12-30

## 2018-12-30 RX ADMIN — AMLODIPINE BESYLATE 5 MILLIGRAM(S): 2.5 TABLET ORAL at 06:05

## 2018-12-30 RX ADMIN — MORPHINE SULFATE 2.5 MILLIGRAM(S): 50 CAPSULE, EXTENDED RELEASE ORAL at 04:20

## 2018-12-30 RX ADMIN — ENOXAPARIN SODIUM 30 MILLIGRAM(S): 100 INJECTION SUBCUTANEOUS at 11:53

## 2018-12-30 RX ADMIN — AZITHROMYCIN 500 MILLIGRAM(S): 500 TABLET, FILM COATED ORAL at 14:30

## 2018-12-30 NOTE — PROGRESS NOTE ADULT - ASSESSMENT
89 yo F with pmh dementia,  complete heart block (no pacemaker), htn BIBEMS from private nursing facility p/w sob and respiratory distress requiring bipap a/w acute respiratory distress sec to suspect secondary to pneumonia, RSV +, CT chest + for large pleural effusion 89 yo F with pmh dementia,  complete heart block (no pacemaker), htn BIBEMS from private nursing facility p/w sob and respiratory distress requiring bipap a/w acute respiratory distress sec to suspect secondary to pneumonia, CHF. Plan for d/c home with hospice tomorrow

## 2018-12-30 NOTE — PROGRESS NOTE ADULT - SUBJECTIVE AND OBJECTIVE BOX
Patient is a 89y old  Female who presents with a chief complaint of encephalopathy, acute respiratory distress and failure requiring bipap (30 Dec 2018 08:33)       INTERVAL HPI/OVERNIGHT EVENTS: 89 yo F with pmh dementia,  complete heart block (no pacemaker), htn BIBEMS from private nursing facility p/w sob and respiratory distress requiring bipap a/w acute respiratory distress sec to suspect secondary to pneumonia, RSV +, CT chest + for large pleural effusion. Seen and examined at bedside. Still feels sob. Denies chest pain. Feels week     MEDICATIONS  (STANDING):  amLODIPine   Tablet 5 milliGRAM(s) Oral daily  azithromycin   Tablet 500 milliGRAM(s) Oral once  enoxaparin Injectable 30 milliGRAM(s) SubCutaneous daily    MEDICATIONS  (PRN):  acetaminophen   Tablet .. 650 milliGRAM(s) Oral every 6 hours PRN Temp greater or equal to 38C (100.4F), Mild Pain (1 - 3)  aluminum hydroxide/magnesium hydroxide/simethicone Suspension 30 milliLiter(s) Oral every 4 hours PRN Dyspepsia  bisacodyl 5 milliGRAM(s) Oral every 12 hours PRN Constipation  guaiFENesin   Syrup  (Sugar-Free) 200 milliGRAM(s) Oral every 6 hours PRN Cough  morphine  - Injectable 1 milliGRAM(s) IV Push every 4 hours PRN dyspnea, distress, pain, palliative measures  morphine Concentrate 5 milliGRAM(s) SubLingual every 1 hour PRN dyspnea, distress, pain      Allergies    sulfa drugs (Rash)    Intolerances        REVIEW OF SYSTEMS:  CONSTITUTIONAL: No fever,  fatigue  EYES: No eye pain, visual disturbances, or discharge  ENMT:  No difficulty hearing, tinnitus, vertigo; No sinus or throat pain  NECK: No pain or stiffness  RESPIRATORY: No cough, wheezing, chills or hemoptysis; + shortness of breath  CARDIOVASCULAR: No chest pain, palpitations, dizziness, or leg swelling  GASTROINTESTINAL: No abdominal or epigastric pain. No nausea, vomiting, or hematemesis; No diarrhea or constipation. No melena or hematochezia.  NEUROLOGICAL: No headaches,  loss of strength, numbness, or tremors  SKIN: No itching, burning, rashes, or lesions   LYMPH NODES: No enlarged glands  ENDOCRINE: No heat or cold intolerance; No hair loss; No polydipsia or polyuria  MUSCULOSKELETAL: No joint pain or swelling; No muscle, back, or extremity pain  PSYCHIATRIC: No depression, anxiety, mood swings, or difficulty sleeping  HEME/LYMPH: No easy bruising, or bleeding gums  ALLERGY AND IMMUNOLOGIC: No hives or eczema    Vital Signs Last 24 Hrs  T(C): 36.8 (30 Dec 2018 04:50), Max: 36.8 (30 Dec 2018 04:50)  T(F): 98.2 (30 Dec 2018 04:50), Max: 98.2 (30 Dec 2018 04:50)  HR: 100 (30 Dec 2018 04:50) (86 - 100)  BP: 182/99 (30 Dec 2018 04:50) (163/83 - 182/99)  BP(mean): --  RR: 18 (30 Dec 2018 04:50) (16 - 18)  SpO2: 94% (30 Dec 2018 04:50) (94% - 100%)    PHYSICAL EXAM:  GENERAL: NAD, well-groomed, well-developed  HEAD:  Atraumatic, Normocephalic  EYES: EOMI, PERRLA, conjunctiva and sclera clear  ENMT: No tonsillar erythema, exudates, or enlargement; Moist mucous membranes, Good dentition, No lesions  NECK: Supple, No JVD, Normal thyroid  NERVOUS SYSTEM:  Alert & Oriented X3, Good concentration; Motor Strength 5/5 B/L upper and lower extremities; DTRs 2+ intact and symmetric  CHEST/LUNG: Clear to auscultation bilaterally; No rales, rhonchi, wheezing, or rubs  HEART: Regular rate and rhythm; No murmurs, rubs, or gallops  ABDOMEN: Soft, Nontender, Nondistended; Bowel sounds present  EXTREMITIES:  2+ Peripheral Pulses, No clubbing, cyanosis, or edema  LYMPH: No lymphadenopathy noted  SKIN: No rashes or lesions    LABS:      Ca    8.7        29 Dec 2018 10:11        CAPILLARY BLOOD GLUCOSE        BLOOD CULTURE    RADIOLOGY & ADDITIONAL TESTS:    Imaging Personally Reviewed:  [ ] YES     Consultant(s) Notes Reviewed:      Care Discussed with Consultants/Other Providers: Patient is a 89y old  Female who presents with a chief complaint of encephalopathy, acute respiratory distress and failure requiring bipap (30 Dec 2018 08:33)       INTERVAL HPI/OVERNIGHT EVENTS: Patient seen and examined at bedside. Lethargic, minimally responsive. Family at bedside Overnight event noted.   MEDICATIONS  (STANDING):  amLODIPine   Tablet 5 milliGRAM(s) Oral daily  azithromycin   Tablet 500 milliGRAM(s) Oral once  enoxaparin Injectable 30 milliGRAM(s) SubCutaneous daily    MEDICATIONS  (PRN):  acetaminophen   Tablet .. 650 milliGRAM(s) Oral every 6 hours PRN Temp greater or equal to 38C (100.4F), Mild Pain (1 - 3)  aluminum hydroxide/magnesium hydroxide/simethicone Suspension 30 milliLiter(s) Oral every 4 hours PRN Dyspepsia  bisacodyl 5 milliGRAM(s) Oral every 12 hours PRN Constipation  guaiFENesin   Syrup  (Sugar-Free) 200 milliGRAM(s) Oral every 6 hours PRN Cough  morphine  - Injectable 1 milliGRAM(s) IV Push every 4 hours PRN dyspnea, distress, pain, palliative measures  morphine Concentrate 5 milliGRAM(s) SubLingual every 1 hour PRN dyspnea, distress, pain      Allergies    sulfa drugs (Rash)    Intolerances        REVIEW OF SYSTEMS:  Unable to obtain secondary to lethargy   Vital Signs Last 24 Hrs  T(C): 36.8 (30 Dec 2018 04:50), Max: 36.8 (30 Dec 2018 04:50)  T(F): 98.2 (30 Dec 2018 04:50), Max: 98.2 (30 Dec 2018 04:50)  HR: 100 (30 Dec 2018 04:50) (86 - 100)  BP: 182/99 (30 Dec 2018 04:50) (163/83 - 182/99)  BP(mean): --  RR: 18 (30 Dec 2018 04:50) (16 - 18)  SpO2: 94% (30 Dec 2018 04:50) (94% - 100%)    PHYSICAL EXAM:  GENERAL: Frail, lethargic, minimally responsive   HEAD:  Atraumatic, Normocephalic  EYES: EOMI, PERRLA, conjunctiva and sclera clear  NERVOUS SYSTEM:  Frail, lethargic, minimally responsive  CHEST/LUNG: + bilat coarse breath sounds, decrease breath sounds bilat   HEART: S1S2+, Regular   ABDOMEN: Soft, Nontender, Nondistended; Bowel sounds present  EXTREMITIES: No clubbing, cyanosis  LYMPH: No lymphadenopathy noted    LABS:      Ca    8.7        29 Dec 2018 10:11        CAPILLARY BLOOD GLUCOSE        BLOOD CULTURE    RADIOLOGY & ADDITIONAL TESTS:    Imaging Personally Reviewed:  [ ] YES     Consultant(s) Notes Reviewed:      Care Discussed with Consultants/Other Providers:

## 2018-12-30 NOTE — PROGRESS NOTE ADULT - SUBJECTIVE AND OBJECTIVE BOX
[facility] -- Arlyn Hester, Amaris Valdez, Eddie Beltran Savella  Office # [phone]    Follow Up: SOB    Subjective/Observations: Unable to obtain ROS due to lethargy    REVIEW OF SYSTEMS: All other review of systems is negative unless indicated above    PAST MEDICAL & SURGICAL HISTORY:  Dementia  Colon cancer  Constipation  H/O colon cancer, stage III: resection, colostomy    MEDICATIONS  (STANDING):  amLODIPine   Tablet 5 milliGRAM(s) Oral daily  enoxaparin Injectable 30 milliGRAM(s) SubCutaneous daily    MEDICATIONS  (PRN):  acetaminophen   Tablet .. 650 milliGRAM(s) Oral every 6 hours PRN Temp greater or equal to 38C (100.4F), Mild Pain (1 - 3)  aluminum hydroxide/magnesium hydroxide/simethicone Suspension 30 milliLiter(s) Oral every 4 hours PRN Dyspepsia  bisacodyl 5 milliGRAM(s) Oral every 12 hours PRN Constipation  guaiFENesin   Syrup  (Sugar-Free) 200 milliGRAM(s) Oral every 6 hours PRN Cough  morphine  - Injectable 1 milliGRAM(s) IV Push every 4 hours PRN dyspnea, distress, pain, palliative measures  morphine Concentrate 5 milliGRAM(s) SubLingual every 1 hour PRN dyspnea, distress, pain    Allergies    sulfa drugs (Rash)    Intolerances    Vital Signs Last 24 Hrs  T(C): 36.3 (30 Dec 2018 13:56), Max: 36.8 (30 Dec 2018 04:50)  T(F): 97.4 (30 Dec 2018 13:56), Max: 98.2 (30 Dec 2018 04:50)  HR: 74 (30 Dec 2018 13:56) (74 - 100)  BP: 171/80 (30 Dec 2018 13:56) (163/83 - 182/99)  BP(mean): --  RR: 18 (30 Dec 2018 13:56) (18 - 18)  SpO2: 99% (30 Dec 2018 13:56) (94% - 99%)    I&O's Summary     PHYSICAL EXAM:  TELE: Not on tele  Constitutional: NAD, alethargic  HEENT: Moist Mucous Membranes, Anicteric  Pulmonary: Non-labored, breath sounds are clear bilaterally, No wheezing, rales.  Coarse rhonchi  Cardiovascular: Regular, S1 and S2, No murmurs, rubs, gallops or clicks  Gastrointestinal: Bowel Sounds present, soft, nontender.   Lymph: pitting LE edema. No lymphadenopathy.  Skin: No visible rashes or ulcers.  Psych:  Mood & affect appropriate    LABS: All Labs Reviewed:                        11.9   11.29 )-----------( 217      ( 29 Dec 2018 10:11 )             36.5                         12.0   11.18 )-----------( 208      ( 28 Dec 2018 08:41 )             37.2     29 Dec 2018 10:11    150    |  114    |  41     ----------------------------<  141    3.6     |  31     |  1.10   28 Dec 2018 08:41    151    |  113    |  37     ----------------------------<  101    4.1     |  29     |  0.97     Ca    8.7        29 Dec 2018 10:11  Ca    8.6        28 Dec 2018 08:41      < from: TTE Echo Doppler w/o Cont (12.27.18 @ 16:49) >     EXAM:  ECHO TTE WO CON COMP W DOPPLR         PROCEDURE DATE:  12/27/2018        INTERPRETATION:  Ordering Physician: ZANDRA BRITO [id number]    Indication: Heart failure    Study Quality: Technically difficult   A complete echocardiographic study was performed utilizing standard   protocol including spectral and color Doppler in all echocardiographic   windows.    Weight: 72.6 kg  BSA: 1.7 sq m  Blood Pressure: 182/101    MEASUREMENTS  IVS: 1.4cm  PWT: 1.4cm  LA: 4.5cm  AO: 3.0cm  LVIDd: 5.5cm  LVIDs: 3.7cm  LVOT: 2.05cm    LVEF: 25-30%  RVSP: 56mmHg    FINDINGS  Left Ventricle: The endocardium is not well-visualized. Severe segmental   left ventricular systolic dysfunction. The lateral wall and anteroseptum   appear hypokinetic.  Aortic Valve: Calcified aortic valve with decreased opening. Severe   aortic stenosis. The peak aortic valve gradient is equal to 86 mmHg, and   the mean aortic valve gradient is equal to 43 mmHg. Estimated aortic   valve area is less than 0.5 sq cm.  Mitral Valve: Mitral annular calcification. Moderate mitral regurgitation.  Tricuspid Valve: Not well-visualized. Mild tricuspid regurgitation  Pulmonic Valve: Not well-visualized.  Left Atrium: Severe left atrial enlargement.  Right Ventricle: The right ventricle is not well visualized.  Right Atrium: Grossly normal.  Diastolic Function: Evidence of marked diastolic dysfunction. E/e' are   elevated suggestive of elevated left sided filling pressures.  Pericardium/Pleura: Bilateral pleural effusions. TheIVC is dilated at   2.41 cm. Trace pericardial effusion.  Hemodynamics: The pulmonary artery systolic pressure is estimated to be   56 mmHg, assuming the right atrial pressure is equal to 15 mmHg,   consistent with moderate pulmonary hypertension.    CONCLUSIONS:  1. Technically difficult and limited study.  2. The endocardium is not well-visualized. Severe segmental left   ventricular systolic dysfunction. Overall EF 25-30%.  3. Severe aortic stenosis.   4. Mitral annular calcification. Moderatemitral regurgitation.  5. Severe left atrial enlargement.  6. Evidence of marked diastolic dysfunction  7. Moderate pulmonary hypertension  8. Bilateral pleural effusions. The IVC is dilated at 2.41 cm. Trace   pericardial effusion.    No prior exam for comparison.    SARAY EDWARDS   This document has been electronically signed. Dec 28 2018  1:22PM     < end of copied text >    < from: Xray Chest 1 View AP/PA (12.26.18 @ 08:36) >    EXAM:  XR CHEST AP OR PA 1V                          PROCEDURE DATE:  12/26/2018      INTERPRETATION:  History: Dyspnea    Technique:  AP portable    Comparisons:  none    Findings:     Patchy infiltrates at lung bases. Possible small left   pleural effusion.  .    The pulmonary vasculature and aorta are normal   for age. Cardiomegaly.    The thorax is normal for age.    Impression: Patchy infiltrates at lung bases. Small left pleural effusion   with cardiomegaly    VANESSA ZUÑIGA M.D., ATTENDING RADIOLOGIST  This document has been electronically signed. Dec 26 2018  8:42AM      < end of copied text >

## 2018-12-30 NOTE — PROGRESS NOTE ADULT - PROBLEM SELECTOR PLAN 2
advanced  dnr./dni  son does not want aggressive measure but treat supportively and empirically and monitor clinical progress
advanced, dnr./dni. Hospice referral done by JAMILAH.  son does not want aggressive measure but treat supportively and empirically and monitor clinical progress

## 2018-12-30 NOTE — PROGRESS NOTE ADULT - PROBLEM SELECTOR PLAN 4
lovenox 30mg renally dosed as crcl34 today and given advanced age for dvt ppx

## 2018-12-30 NOTE — PROGRESS NOTE ADULT - ASSESSMENT
89 yo female hx of complete heart block (no pacemaker), htn BIBEMS from private nursing facility c/o shortness of breath/possible aspiration pneumonia. EMS placed patient on cpap.  Found to be hypoxic to 80's at facility.  Has MOLST is DNR/DNI.   Here in ED WBC 25 lactate 5.7 Trop 0.375 proBNP 35534. Chest X ray reveals patchy infiltrates at lung bases and a small left pleural effusion with cardiomegaly. EKG reveals sinus rhythm with LBBB and occasional premature ventricular complexes in a pattern of bigeminy vs possible premature atrial complexes with aberrant conduction.    - Doubt ACS. CE leak most likely in the setting of sepsis and lactic acidosis.  Lactate normalized after fluid resuscitation of 2 L  - Elevated pro BNP possibly volume overloaded, though, her exam does not support it.   Would try to keep net even, and continue to monitor volume status. If needed can repeat lasix 40 iv  - Echo shows severe LVF, EF 25-30%, severe AS, mild MR and pHTN  - Continue BiPAP and Abx. Oxygen supplementation as necessary.  Follow Pulm recs  - Patient is a DNR/DNI and now on comfort care and pleasure feeds.   - For discharge to Home Hospice in     Rita Lozano NP  Cardiology

## 2018-12-30 NOTE — PROGRESS NOTE ADULT - ATTENDING COMMENTS
I saw and examined the patient personally. Spoke with above provider regarding this case. I reviewed the above findings completely.  I agree with the above history, physical, and plan which I have edited where appropriate.
Seen/examined. agree with above.
>30 minutes were spent discussing advanced care planning. The time spent discussing advanced care planning was separate from the remainder of the encounter. The total time spent for the encounter which is reflected below, does not include the time spent specifically discussing advanced care planning. At this time, the patient's Son has made the informed decision to elect for hospice care. SW made referral and he would like to have hospice at home.
Hospice evaluation for [facility]  Prognosis very poor
Plan for home hospice/ comfort   D/w Son Dr. Villalobos at bedside

## 2018-12-30 NOTE — PROGRESS NOTE ADULT - SUBJECTIVE AND OBJECTIVE BOX
Date/Time Patient Seen:  		  Referring MD:   Data Reviewed	       Patient is a 89y old  Female who presents with a chief complaint of encephalopathy, acute respiratory distress and failure requiring bipap (29 Dec 2018 15:20)      Subjective/HPI     PAST MEDICAL & SURGICAL HISTORY:  Dementia  Colon cancer  Constipation  H/O colon cancer, stage III: resection, colostomy        Medication list         MEDICATIONS  (STANDING):  amLODIPine   Tablet 5 milliGRAM(s) Oral daily  azithromycin   Tablet 500 milliGRAM(s) Oral once  cefTRIAXone   IVPB 1 Gram(s) IV Intermittent every 24 hours  enoxaparin Injectable 30 milliGRAM(s) SubCutaneous daily    MEDICATIONS  (PRN):  acetaminophen   Tablet .. 650 milliGRAM(s) Oral every 6 hours PRN Temp greater or equal to 38C (100.4F), Mild Pain (1 - 3)  aluminum hydroxide/magnesium hydroxide/simethicone Suspension 30 milliLiter(s) Oral every 4 hours PRN Dyspepsia  bisacodyl 5 milliGRAM(s) Oral every 12 hours PRN Constipation  guaiFENesin   Syrup  (Sugar-Free) 200 milliGRAM(s) Oral every 6 hours PRN Cough  morphine  - Injectable 1 milliGRAM(s) IV Push every 4 hours PRN dyspnea, distress, pain, palliative measures         Vitals log        ICU Vital Signs Last 24 Hrs  T(C): 36.8 (30 Dec 2018 04:50), Max: 36.8 (30 Dec 2018 04:50)  T(F): 98.2 (30 Dec 2018 04:50), Max: 98.2 (30 Dec 2018 04:50)  HR: 100 (30 Dec 2018 04:50) (86 - 100)  BP: 182/99 (30 Dec 2018 04:50) (163/83 - 182/99)  BP(mean): --  ABP: --  ABP(mean): --  RR: 18 (30 Dec 2018 04:50) (16 - 18)  SpO2: 94% (30 Dec 2018 04:50) (94% - 100%)           Input and Output:  I&O's Detail      Lab Data                        11.9   11.29 )-----------( 217      ( 29 Dec 2018 10:11 )             36.5     12-29    150<H>  |  114<H>  |  41<H>  ----------------------------<  141<H>  3.6   |  31  |  1.10    Ca    8.7      29 Dec 2018 10:11              Review of Systems	      Objective     Physical Examination      frail  weak  lung dec BS  abd soft  heart s1s2    Pertinent Lab findings & Imaging      Adri:  NO   Adequate UO     I&O's Detail           Discussed with:     Cultures:	        Radiology

## 2018-12-30 NOTE — PROGRESS NOTE ADULT - PROBLEM SELECTOR PROBLEM 3
Encephalopathy, unspecified

## 2018-12-31 ENCOUNTER — TRANSCRIPTION ENCOUNTER (OUTPATIENT)
Age: 83
End: 2018-12-31

## 2018-12-31 VITALS — SYSTOLIC BLOOD PRESSURE: 174 MMHG | HEART RATE: 88 BPM | DIASTOLIC BLOOD PRESSURE: 111 MMHG | TEMPERATURE: 97 F

## 2018-12-31 LAB
CULTURE RESULTS: SIGNIFICANT CHANGE UP
CULTURE RESULTS: SIGNIFICANT CHANGE UP
SPECIMEN SOURCE: SIGNIFICANT CHANGE UP
SPECIMEN SOURCE: SIGNIFICANT CHANGE UP

## 2018-12-31 PROCEDURE — 85027 COMPLETE CBC AUTOMATED: CPT

## 2018-12-31 PROCEDURE — 85610 PROTHROMBIN TIME: CPT

## 2018-12-31 PROCEDURE — 82962 GLUCOSE BLOOD TEST: CPT

## 2018-12-31 PROCEDURE — 85730 THROMBOPLASTIN TIME PARTIAL: CPT

## 2018-12-31 PROCEDURE — 87631 RESP VIRUS 3-5 TARGETS: CPT

## 2018-12-31 PROCEDURE — 80048 BASIC METABOLIC PNL TOTAL CA: CPT

## 2018-12-31 PROCEDURE — 94660 CPAP INITIATION&MGMT: CPT

## 2018-12-31 PROCEDURE — 99239 HOSP IP/OBS DSCHRG MGMT >30: CPT

## 2018-12-31 PROCEDURE — 71045 X-RAY EXAM CHEST 1 VIEW: CPT

## 2018-12-31 PROCEDURE — 36415 COLL VENOUS BLD VENIPUNCTURE: CPT

## 2018-12-31 PROCEDURE — 84145 PROCALCITONIN (PCT): CPT

## 2018-12-31 PROCEDURE — 83605 ASSAY OF LACTIC ACID: CPT

## 2018-12-31 PROCEDURE — 94640 AIRWAY INHALATION TREATMENT: CPT

## 2018-12-31 PROCEDURE — 87040 BLOOD CULTURE FOR BACTERIA: CPT

## 2018-12-31 PROCEDURE — 93306 TTE W/DOPPLER COMPLETE: CPT

## 2018-12-31 PROCEDURE — 93005 ELECTROCARDIOGRAM TRACING: CPT

## 2018-12-31 PROCEDURE — 96365 THER/PROPH/DIAG IV INF INIT: CPT

## 2018-12-31 PROCEDURE — 96375 TX/PRO/DX INJ NEW DRUG ADDON: CPT

## 2018-12-31 PROCEDURE — 82550 ASSAY OF CK (CPK): CPT

## 2018-12-31 PROCEDURE — 80053 COMPREHEN METABOLIC PANEL: CPT

## 2018-12-31 PROCEDURE — 82553 CREATINE MB FRACTION: CPT

## 2018-12-31 PROCEDURE — 83880 ASSAY OF NATRIURETIC PEPTIDE: CPT

## 2018-12-31 PROCEDURE — 99285 EMERGENCY DEPT VISIT HI MDM: CPT | Mod: 25

## 2018-12-31 PROCEDURE — 84484 ASSAY OF TROPONIN QUANT: CPT

## 2018-12-31 PROCEDURE — 86140 C-REACTIVE PROTEIN: CPT

## 2018-12-31 RX ORDER — MORPHINE SULFATE 50 MG/1
5 CAPSULE, EXTENDED RELEASE ORAL
Qty: 0 | Refills: 0 | COMMUNITY
Start: 2018-12-31

## 2018-12-31 RX ORDER — AMLODIPINE BESYLATE 2.5 MG/1
1 TABLET ORAL
Qty: 0 | Refills: 0 | COMMUNITY
Start: 2018-12-31

## 2018-12-31 RX ORDER — ACETAMINOPHEN 500 MG
0 TABLET ORAL
Qty: 0 | Refills: 0 | COMMUNITY

## 2018-12-31 RX ADMIN — MORPHINE SULFATE 5 MILLIGRAM(S): 50 CAPSULE, EXTENDED RELEASE ORAL at 12:02

## 2018-12-31 RX ADMIN — MORPHINE SULFATE 5 MILLIGRAM(S): 50 CAPSULE, EXTENDED RELEASE ORAL at 00:22

## 2018-12-31 RX ADMIN — MORPHINE SULFATE 2.5 MILLIGRAM(S): 50 CAPSULE, EXTENDED RELEASE ORAL at 04:35

## 2018-12-31 RX ADMIN — MORPHINE SULFATE 5 MILLIGRAM(S): 50 CAPSULE, EXTENDED RELEASE ORAL at 07:02

## 2018-12-31 RX ADMIN — MORPHINE SULFATE 5 MILLIGRAM(S): 50 CAPSULE, EXTENDED RELEASE ORAL at 02:53

## 2018-12-31 RX ADMIN — MORPHINE SULFATE 5 MILLIGRAM(S): 50 CAPSULE, EXTENDED RELEASE ORAL at 08:34

## 2018-12-31 RX ADMIN — AMLODIPINE BESYLATE 5 MILLIGRAM(S): 2.5 TABLET ORAL at 05:50

## 2018-12-31 RX ADMIN — MORPHINE SULFATE 5 MILLIGRAM(S): 50 CAPSULE, EXTENDED RELEASE ORAL at 12:31

## 2018-12-31 RX ADMIN — MORPHINE SULFATE 5 MILLIGRAM(S): 50 CAPSULE, EXTENDED RELEASE ORAL at 00:07

## 2018-12-31 RX ADMIN — MORPHINE SULFATE 5 MILLIGRAM(S): 50 CAPSULE, EXTENDED RELEASE ORAL at 03:08

## 2018-12-31 RX ADMIN — ENOXAPARIN SODIUM 30 MILLIGRAM(S): 100 INJECTION SUBCUTANEOUS at 12:03

## 2018-12-31 NOTE — DISCHARGE NOTE ADULT - MEDICATION SUMMARY - MEDICATIONS TO TAKE
I will START or STAY ON the medications listed below when I get home from the hospital:    morphine 20 mg/mL oral concentrate  -- 5 milligram(s) by mouth every hour, As Needed   -- Indication: For Pain control    aluminum hydroxide-magnesium hydroxide 200 mg-200 mg/5 mL oral suspension  -- 30 milliliter(s) by mouth every 4 hours, As needed, Dyspepsia  -- Indication: For GERD     amLODIPine 5 mg oral tablet  -- 1 tab(s) by mouth once a day  -- Indication: For HTN     guaiFENesin 100 mg/5 mL oral liquid  -- 10 milliliter(s) by mouth every 6 hours, As needed, Cough  -- Indication: For Cough     Senna 8.6 mg oral tablet  -- orally once a day  -- Indication: For Constipation    bisacodyl 5 mg oral delayed release tablet  -- 1 tab(s) by mouth every 12 hours, As needed, Constipation  -- Indication: For Constipation

## 2018-12-31 NOTE — DISCHARGE NOTE ADULT - PATIENT PORTAL LINK FT
You can access the [facility] Patient Portal, offered by [facility], by registering with the following website: [email or web address]

## 2018-12-31 NOTE — DISCHARGE NOTE ADULT - HOSPITAL COURSE
89 yo female presents to the hospital with acute respiratory failure secondary to fluid overload, and presumed PNA, treated without significant improvement. Goals of care reviewed with son the HCP, and wish dnr comfort care, no aggressive measures, home with hospice.  Hospice service initiated while in hospital setting, with pain controlled and patient comfortable.  staff provided emotional support.  dc to home with hospice services. scripts written and sent to pharmacy.     greater than 35 mins dc time

## 2018-12-31 NOTE — DISCHARGE NOTE ADULT - CARE PLAN
Principal Discharge DX:	Respiratory distress  Goal:	hospice management  Assessment and plan of treatment:	home hospice service

## 2018-12-31 NOTE — PROGRESS NOTE ADULT - PROVIDER SPECIALTY LIST ADULT
Cardiology
Hospitalist
Hospitalist
Pulmonology
Hospitalist
Hospitalist

## 2018-12-31 NOTE — DISCHARGE NOTE ADULT - CARE PROVIDER_API CALL
Sourav Gordon (MD), Internal Medicine  [address] [id number]  Phone: [phone]  Fax: [phone]

## 2018-12-31 NOTE — PROGRESS NOTE ADULT - PROBLEM SELECTOR PROBLEM 1
Respiratory distress
Dementia
Pneumonia due to infectious organism, unspecified laterality, unspecified part of lung
Respiratory distress
Respiratory distress
Pneumonia due to infectious organism, unspecified laterality, unspecified part of lung
Respiratory distress

## 2018-12-31 NOTE — PROGRESS NOTE ADULT - REASON FOR ADMISSION
encephalopathy, acute respiratory distress and failure requiring bipap
AMS<  acute respiratory distress and failure requiring bipap

## 2018-12-31 NOTE — PROGRESS NOTE ADULT - SUBJECTIVE AND OBJECTIVE BOX
Date/Time Patient Seen:  		  Referring MD:   Data Reviewed	       Patient is a 89y old  Female who presents with a chief complaint of encephalopathy, acute respiratory distress and failure requiring bipap (30 Dec 2018 16:37)      Subjective/HPI     PAST MEDICAL & SURGICAL HISTORY:  Dementia  Colon cancer  Constipation  H/O colon cancer, stage III: resection, colostomy        Medication list         MEDICATIONS  (STANDING):  amLODIPine   Tablet 5 milliGRAM(s) Oral daily  enoxaparin Injectable 30 milliGRAM(s) SubCutaneous daily    MEDICATIONS  (PRN):  acetaminophen   Tablet .. 650 milliGRAM(s) Oral every 6 hours PRN Temp greater or equal to 38C (100.4F), Mild Pain (1 - 3)  aluminum hydroxide/magnesium hydroxide/simethicone Suspension 30 milliLiter(s) Oral every 4 hours PRN Dyspepsia  bisacodyl 5 milliGRAM(s) Oral every 12 hours PRN Constipation  guaiFENesin   Syrup  (Sugar-Free) 200 milliGRAM(s) Oral every 6 hours PRN Cough  morphine  - Injectable 1 milliGRAM(s) IV Push every 4 hours PRN dyspnea, distress, pain, palliative measures  morphine Concentrate 5 milliGRAM(s) SubLingual every 1 hour PRN dyspnea, distress, pain         Vitals log        ICU Vital Signs Last 24 Hrs  T(C): 36.3 (31 Dec 2018 05:59), Max: 36.5 (30 Dec 2018 20:56)  T(F): 97.4 (31 Dec 2018 05:59), Max: 97.7 (30 Dec 2018 20:56)  HR: 88 (31 Dec 2018 05:59) (74 - 88)  BP: 174/111 (31 Dec 2018 05:59) (171/80 - 185/110)  BP(mean): --  ABP: --  ABP(mean): --  RR: 19 (30 Dec 2018 20:56) (18 - 19)  SpO2: 95% (30 Dec 2018 20:56) (95% - 99%)           Input and Output:  I&O's Detail      Lab Data                        11.9   11.29 )-----------( 217      ( 29 Dec 2018 10:11 )             36.5     12-29    150<H>  |  114<H>  |  41<H>  ----------------------------<  141<H>  3.6   |  31  |  1.10    Ca    8.7      29 Dec 2018 10:11              Review of Systems	      Objective     Physical Examination  heart s1s2  lung dec BS  abd soft  tachypnea        Pertinent Lab findings & Imaging      Adri:  NO   Adequate UO     I&O's Detail           Discussed with:     Cultures:	        Radiology

## 2021-06-24 NOTE — CHART NOTE - NSCHARTNOTEFT_GEN_A_CORE
Called by RN to evaluate patient who appears uncomfortable (DNR/DNI, to be made comfortable though no firm documentation on full comfort care of yet per chart review). Patient tachypneic and shivering though no IV access, and minimally responsive and does not follow commands so aspiration risk is high. Patient given roxanol 2.5 mg sublingually (equivalent to 1 mg IV morphine which patient is ordered for per pharmacy) once.     T(C): 36.7 (12-29-18 @ 20:33), Max: 36.7 (12-29-18 @ 13:18)  HR: 86 (12-29-18 @ 20:33) (84 - 86)  BP: 163/83 (12-29-18 @ 20:33) (163/83 - 182/104)  RR: 18 (12-29-18 @ 20:33) (16 - 18)  SpO2: 95% (12-29-18 @ 20:33) (95% - 100%)    GENERAL: shivering, appears ill, cachectic   EYES: sclera clear, no exudates  ENMT: dry mucous membranes   NECK: supple, soft   LUNGS: mild wheezing to anterior auscultation   HEART: tachycardic, reular rhythm   GASTROINTESTINAL: soft nontender  MUSCULOSKELETAL: no clubbing or cyanosis, no obvious deformity  NEUROLOGIC: unresponsive, poor muscle tone  PSYCHIATRIC: unable to ascertain 2/2 mental status   HEME/LYMPH: diffuse ecchymosis       Assessment and Plan:   89 yo F with pmh dementia,  complete heart block (no pacemaker), htn BIBEMS from private nursing facility p/w sob and respiratory distress requiring bipap a/w acute respiratory distress sec to suspect asp pna and poss pulm edema questionable chf    -roxanol 2.5 mg sublingually once   -patient possibly anxious with decreased respiratory reserve 2/2 asp pna though unable to ascertain 2/2 mental status This is noted she should f/u to officially establish care in the next 6 months.

## 2021-10-25 NOTE — ED ADULT NURSE NOTE - [id number]_ED_A_ED
10/25/2021    [address] [address] [address] [facility]        Dear Adair Flores,    This is a reminder that it is time for you to schedule an appointment with GASTROENTEROLOGY for one of the following:     Colonoscopy and EGD (Upper Endoscopy)    Our records indicate your last procedure with DR Nedra Boeck was 11.21.2019. Please call [phone] to schedule your procedure. If you reach the GI schedulers voicemail, please leave the following information: NAME, DATE OF BIRTH, and PHONE NUMBER. They will return your call as soon as possible to help you schedule your procedure. Our gastroenterologists require that you see your primary care physician within the last 12 months prior to us being able to schedule your procedure. Sincerely,    Iban Ayers MD, Guillermo Begum MD, Sarahi Vazquez MD, Lucas Romano MD and Rena Vega MD     Department of Gastroenterology    [facility] [address] [place], [address] [place] [address] No

## 2023-04-18 NOTE — DIETITIAN INITIAL EVALUATION ADULT. - NUTRITION DIAGNOSTIC TERMINOLOGY #1
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 Plan to treat with Efudex.
Detail Level: Simple
Render Risk Assessment In Note?: no
Functional

## 2024-03-17 NOTE — DISCHARGE NOTE ADULT - FUNCTIONAL SCREEN CURRENT LEVEL: BATHING, MLM
Marie Hicks  Thoracic Surgery  [id number] [place] [address], [address] [place] [address]  Phone: [phone]  Fax: [phone]  Established Patient  Follow Up Time: 2 weeks   4 = completely dependent

## 2024-04-01 NOTE — GOALS OF CARE CONVERSATION - PERSONAL ADVANCE DIRECTIVE - CONVERSATION DETAILS
Please send in Statin medication to pharmacy delivery
spoke to pt son, Dr Villalobos, on phone, reviewed pt molst from 2015, son agreed to update molst form: agrees to dnr dni, wants comfort care, but treat at this time, IV flds, antibiotics. Dr Blanchard to complete molst form. contact # given.
Call placed to son, Family opting for [facility].

## 2024-10-01 NOTE — ED ADULT NURSE NOTE - NS ED NURSE LEVEL OF CONSCIOUSNESS ORIENTATION
PCP, please advise patient's mychart message.       Brady Tyler, ART on 10/1/2024 at 3:05 PM     Nonverbal